# Patient Record
Sex: FEMALE | Race: WHITE | NOT HISPANIC OR LATINO | Employment: PART TIME | URBAN - METROPOLITAN AREA
[De-identification: names, ages, dates, MRNs, and addresses within clinical notes are randomized per-mention and may not be internally consistent; named-entity substitution may affect disease eponyms.]

---

## 2024-01-08 ENCOUNTER — HOSPITAL ENCOUNTER (OUTPATIENT)
Dept: RADIOLOGY | Facility: HOSPITAL | Age: 64
Discharge: HOME | End: 2024-01-08
Payer: COMMERCIAL

## 2024-01-08 ENCOUNTER — EDUCATION (OUTPATIENT)
Dept: ORTHOPEDIC SURGERY | Facility: HOSPITAL | Age: 64
End: 2024-01-08
Payer: COMMERCIAL

## 2024-01-08 DIAGNOSIS — M25.562 PAIN IN LEFT KNEE: ICD-10-CM

## 2024-01-08 PROCEDURE — 73700 CT LOWER EXTREMITY W/O DYE: CPT | Mod: LT

## 2024-01-08 ASSESSMENT — KOOS JR
HOW SEVERE IS YOUR KNEE STIFFNESS AFTER FIRST WAKING IN MORNING: MODERATE
BENDING TO THE FLOOR TO PICK UP OBJECT: SEVERE
GOING UP OR DOWN STAIRS: SEVERE
KOOS JR SCORING: 39.63
TWISING OR PIVOTING ON KNEE: SEVERE
STRAIGHTENING KNEE FULLY: MODERATE
RISING FROM SITTING: SEVERE
STANDING UPRIGHT: SEVERE

## 2024-01-08 NOTE — PROGRESS NOTES
Thank you for attending our Joint Replacement class today in preparation for your upcoming surgery.  Topics discussed include:    MyChart Enrollment  Communication with Care Team  My Chart is the best form of communication to reach all of your caregivers  You can send messages to specific care givers, or a care team  Continued Education  You will be enrolled in a Total Joint Replacement care plan to receive additional education before and after surgery  You can review a short recording of the class content  Access to Medical Records  You can access test results, office notes, appointments, etc.  You can connect to other healthcare systems who use [facility] ([facility], [facility], [facility], [place], etc.)  [email or web address]  Program Information  Consent to Enroll    Background/Understanding of Joint Replacement Surgery  Potential for same day discharge  Any questions or concerns to be directed to the surgeon's office    How to Prepare for Surgery  Use of Nicotine Products/Smoking  Stop several weeks before surgery  Such products slow down the healing process and increase risk of post-op infection and complications  Clearance for Surgery  Medical Clearance by Specialists  Dental Clearance  Cracked/Broken/Loose teeth left untreated may postpone surgery  The importance of post-op antibiotics for dental visits per surgeon protocol  Preadmission Testing  **Potential for postponed surgery if appropriate clearance is not obtained  Medication Instruction  Follow instructions provided by the doctor who prescribes your medication (typically, but not limited to cardiologist)  Preadmission testing will provide additional instructions during your appointment on what to stop and what to take as you get closer to surgery  For clarification of these instructions, please call preadmission testing directly - [phone]  Tips for Preparing the home for discharge from the hospital  Care Partner  Requirement for surgery, the patient must have a plan to have  help at home  Potential for postponed surgery if plan for home support cannot be established  How the care partner can help after surgery  CHG Body Wash/Mouth Wash  Follow the instructions given at preadmission testing  Body wash is to be used on the body and hair for 5 washes  Mouthwash is to be used the night before and morning of surgery  **This is a system-wide protocol developed by infectious disease professionals, we will not alter our recommendations for those with sensitive skin or those who have special hair needs.  Please follow the instructions as they are written as this will provide the best infection prevention measures for surgery.  Should you have an allergy to one of the products, please discuss with your preadmission team**    What to Expect in the Hospital/At Home  Morning of Surgery NPO Guidelines  Nothing to eat after midnight  Water can be consumed up to 2 hours prior to arrival  Surgical and Post-Surgical Care Team  Surgical Team  Anesthesia Team  Nursing  Physical Therapy  Care Coordinating  Pharmacy  Hospital Arrival Instructions  Arrive at the time provided to you  Consider traffic patterns (rush-hour) based on arrival time  Have arrangements made for a ride home  If discharging same day, care partner should remain at the hospital  Recovering after Surgery  Recovery Room - Visitors are not brought back  Transition to hospital room - 2nd Floor, Visitors will be directed to your room  The presence of and strategies for controlling surgical pain and swelling  The importance of early mobility  Side effects after surgery  What to expect if staying overnight    Discharge Planning  The intended plan for discharge will be for patients to discharge home  All patients require a care partner (family, friend, neighbor, etc.) to stay with the patient for the first few nights after surgery  The inability to secure help at home will postpone surgery  Home Care Services set up per surgeon order  Physical  Therapy  Occupational Therapy  **If desired, private duty care can be arranged by the patient ahead of time**  Outpatient Physical Therapy per surgeon order    Recovering at Home  Wound Care  Follow wound care instructions found in your discharge paperwork  Bandage is water-resistant and you may shower with the bandage  Do not scrub directly over the bandage  Do not submerge in water until cleared (bathtub, hot tub, pool, etc.)    Post-Op Risk Prevention  Infection Prevention  Promptly seek treatment for any infections post-operatively  Routine dental visits must be postponed for 3 months after surgery  Your surgeon may require antibiotics prior to future dental visits  Any concerns for infection not related directly to the knee or the hip should be managed by your primary care provider  Blood Clots  Be sure to complete the course of blood thinning medication as prescribed by your surgeon  Movement every 1-2 hours during the day is encouraged to prevent blood clots  Monitor for signs of blood clots  Wear compression stockings as prescribed by your surgeon  Constipation  Constipation is common following surgery  Drink plenty of fluids  Take stool softener/laxative as prescribed by your surgeon  Move around frequently  Eat foods high in fiber  Fall Prevention  Prepare home ahead of time to clear space to move with walker  Remove throw rugs and electrical cords from walkways  Install railings near any stairways with more than 2 steps  Use night lights for increased visibility at night  Continue to use your assistive device until cleared by surgeon or physical therapy  Dislocation Prevention - Not all procedures will have dislocation precautions  Follow dislocation precautions provided by your surgeon  It is OK to resume sexual activity about 6 weeks following surgery  Be sure to follow any dislocation precautions assigned    Durable Medical Equipment  Cold Therapy  Breg Cold Therapy Machines  Ice/Gel Packs  Assistive  Devices  Folding Walker with Wheels (in the front only)  No Rollators  Crutches if approved by Physical Therapy and Surgeon after surgery  Hip Kits  Raised Toilet Seats  Additional Compression Stockings    Joint Preservation  Healthy Activities when Cleared  Walking  Swimming  Bike Riding  Activities to Avoid  Refrain from repetitive motions which have a high impact on the joint  Gradual Progression  Progress activity slowly, listen to your body  Common Findings - NORMAL after surgery  Clicking/Grinding  Numbness near incision    Physical Therapy  Prehabilitation exercises  START TODAY ON BOTH LEGS  Surgery Specific Precautions  Follow surgery specific precautions found in your discharge paperwork    Follow-Up Visit  All patients will see their surgeon for a follow up visit after surgery  The visit may range from 2-6 weeks after surgery and is surgeon specific      Please don't hesitate to reach out if you have any additional questions or concerns.    Usha Soto MBA, BSN, RN-BC  MONSE DealN, RN  Orthopedic Program Navigators  [facility]   [phone]

## 2024-01-18 ENCOUNTER — PRE-ADMISSION TESTING (OUTPATIENT)
Dept: PREADMISSION TESTING | Facility: HOSPITAL | Age: 64
End: 2024-01-18
Payer: COMMERCIAL

## 2024-01-18 VITALS
HEART RATE: 95 BPM | SYSTOLIC BLOOD PRESSURE: 136 MMHG | OXYGEN SATURATION: 96 % | DIASTOLIC BLOOD PRESSURE: 88 MMHG | HEIGHT: 69 IN | TEMPERATURE: 97.4 F | RESPIRATION RATE: 16 BRPM | BODY MASS INDEX: 39.23 KG/M2 | WEIGHT: 264.88 LBS

## 2024-01-18 DIAGNOSIS — Z01.818 PREOPERATIVE TESTING: Primary | ICD-10-CM

## 2024-01-18 LAB
APPEARANCE UR: CLEAR
BILIRUB UR STRIP.AUTO-MCNC: NEGATIVE MG/DL
COLOR UR: YELLOW
EST. AVERAGE GLUCOSE BLD GHB EST-MCNC: 128 MG/DL
GLUCOSE UR STRIP.AUTO-MCNC: NEGATIVE MG/DL
HBA1C MFR BLD: 6.1 %
HOLD SPECIMEN: NORMAL
KETONES UR STRIP.AUTO-MCNC: NEGATIVE MG/DL
LEUKOCYTE ESTERASE UR QL STRIP.AUTO: NEGATIVE
NITRITE UR QL STRIP.AUTO: NEGATIVE
PH UR STRIP.AUTO: 7 [PH]
PROT UR STRIP.AUTO-MCNC: NEGATIVE MG/DL
RBC # UR STRIP.AUTO: NEGATIVE /UL
SP GR UR STRIP.AUTO: 1.01
UROBILINOGEN UR STRIP.AUTO-MCNC: 0.2 MG/DL

## 2024-01-18 PROCEDURE — 83036 HEMOGLOBIN GLYCOSYLATED A1C: CPT

## 2024-01-18 PROCEDURE — 87081 CULTURE SCREEN ONLY: CPT

## 2024-01-18 PROCEDURE — 93010 ELECTROCARDIOGRAM REPORT: CPT | Performed by: INTERNAL MEDICINE

## 2024-01-18 PROCEDURE — 93005 ELECTROCARDIOGRAM TRACING: CPT

## 2024-01-18 PROCEDURE — 36415 COLL VENOUS BLD VENIPUNCTURE: CPT

## 2024-01-18 PROCEDURE — 81003 URINALYSIS AUTO W/O SCOPE: CPT

## 2024-01-18 PROCEDURE — 99204 OFFICE O/P NEW MOD 45 MIN: CPT | Performed by: NURSE PRACTITIONER

## 2024-01-18 RX ORDER — VENLAFAXINE HYDROCHLORIDE 75 MG/1
75 CAPSULE, EXTENDED RELEASE ORAL DAILY
COMMUNITY
Start: 2023-12-12 | End: 2024-12-11

## 2024-01-18 RX ORDER — CHLORHEXIDINE GLUCONATE ORAL RINSE 1.2 MG/ML
15 SOLUTION DENTAL AS NEEDED
Qty: 30 ML | Refills: 0 | Status: SHIPPED | OUTPATIENT
Start: 2024-01-18 | End: 2024-02-01 | Stop reason: HOSPADM

## 2024-01-18 RX ORDER — IBUPROFEN 200 MG
600 TABLET ORAL EVERY 6 HOURS PRN
COMMUNITY
End: 2024-02-01 | Stop reason: HOSPADM

## 2024-01-18 ASSESSMENT — PAIN DESCRIPTION - DESCRIPTORS: DESCRIPTORS: ACHING;SORE

## 2024-01-18 ASSESSMENT — PAIN - FUNCTIONAL ASSESSMENT: PAIN_FUNCTIONAL_ASSESSMENT: 0-10

## 2024-01-18 ASSESSMENT — PAIN SCALES - GENERAL: PAINLEVEL_OUTOF10: 1

## 2024-01-18 NOTE — CPM/PAT H&P
CPM/PAT Evaluation     Phyllis Castro is a 63 y.o. female   Chief Complaint: knee pain having a knee replacement    HPI:  Patient is a 62 y/o alert and oriented female coming in for PAT for a scheduled Arthroplasty Total Knee Left - Robotic Assisted on 24 w/ Dr. Helton.  The patient reports she has intermittent knee pain.  She reports her knee does swell and will give out.  She reports sitting and standing exacerbates the discomfort.  Movement helps.  She had had both cortisone injections and physical therapy.  Patient denies chest pain, SOB, VALLEJO and NVDC.  Patient also denies Hx: DVT/PE.  Current medications were reviewed and a presurgical mediation schedule was provided.  She has no questions at this time.   Past Medical History:   Diagnosis Date    Acute vaginitis 2015    Bacterial vaginosis    Candidiasis, unspecified 2015    Yeast infection    Encounter for screening mammogram for malignant neoplasm of breast 01/15/2016    Visit for screening mammogram    Encounter for screening, unspecified 2015    Screening    Personal history of other diseases of the female genital tract     History of ovarian cyst    Personal history of other diseases of the female genital tract 2015    History of menorrhagia    Personal history of other endocrine, nutritional and metabolic disease     History of dehydration    Plantar fascial fibromatosis 2015    Plantar fasciitis    Unspecified benign mammary dysplasia of unspecified breast     Atypical lobular hyperplasia of breast    Unspecified ovarian cyst, left side 2015    Cyst of left ovary      Past Surgical History:   Procedure Laterality Date     SECTION, CLASSIC  2015     Section    FOOT SURGERY  2015    Foot Repair    OTHER SURGICAL HISTORY  2015    Bx Breast Percutan Needle Core Use Imag Guide (Stereotactic)    OTHER SURGICAL HISTORY  2015    Biopsy Lung Percutaneous    TONSILLECTOMY   01/06/2015    Tonsillectomy    TUBAL LIGATION  01/06/2015    Tubal Ligation    UMBILICAL HERNIA REPAIR  01/06/2015    Umbilical Hernia Repair        No Known Allergies     Current Outpatient Medications on File Prior to Visit   Medication Sig Dispense Refill    venlafaxine XR (Effexor-XR) 75 mg 24 hr capsule Take 1 capsule (75 mg) by mouth once daily.       No current facility-administered medications on file prior to visit.       Review of Systems   Respiratory:          Known sleep apnea compliant with cpap therapy   Musculoskeletal:         See hpi for details   All other systems reviewed and are negative.     Physical Exam  Vitals and nursing note reviewed.   Constitutional:       Appearance: Normal appearance.   HENT:      Head: Normocephalic and atraumatic.      Mouth/Throat:      Mouth: Mucous membranes are moist.      Pharynx: Oropharynx is clear.   Eyes:      Pupils: Pupils are equal, round, and reactive to light.   Cardiovascular:      Rate and Rhythm: Normal rate and regular rhythm.      Heart sounds: Normal heart sounds.      Comments: EKG today NSR rate of 86  Pulmonary:      Effort: Pulmonary effort is normal.      Breath sounds: Normal breath sounds.   Abdominal:      General: Bowel sounds are normal.      Palpations: Abdomen is soft.   Musculoskeletal:         General: Swelling present. Normal range of motion.      Cervical back: Normal range of motion and neck supple.      Comments: Left knee swelling   Skin:     General: Skin is warm and dry.   Neurological:      Mental Status: She is alert and oriented to person, place, and time.   Psychiatric:         Mood and Affect: Mood normal.         Behavior: Behavior normal.         Thought Content: Thought content normal.         Judgment: Judgment normal.        PAT AIRWAY:   Airway:     Mallampati::  IV    TM distance::  >3 FB    Neck ROM::  Full  Had dental crowns  Does not smoke  1 beer a week  No drug use  Has trouble waking up from anesthesia  No  family issues with anesthesia  No nickel, iodine or shellfish allergy    Airway  Vitals:    01/18/24 0934   BP: 136/88   Pulse: 95   Resp: 16   Temp: 36.3 °C (97.4 °F)   SpO2: 96%      Assessment and Plan:   Primary Osteoarthritis of Left Knee  Arthroplasty Total Knee Robotic Assisted  Managed with motrin prn    ASA II  RCRI - 0 points  Class I Risk 3.9%  LIVIER - points Risk for BONITA known BONITA compliant with cpap therapy  NSQIP - Predicted length of stay 0-1 days  ARISCAT - 3 points Low Risk 1.6%  DASI 34.7 Points 7.01 Mets  ABBEY - 0.1%  JHFRAT - 1 points low risk for falls  Clearance - not indicated  PAT Testing - CBC, CMP, UA, MRSA PCR, EKG  CBC, CMP completed on 11/5/23 - stable    CHLORHEXIDINE .12% DENTAL RINSE E PRESCIRBED PER  INFECTION PREVENTION PROTOCOL. PATIENT EDUCATED   Patient advised to call [place] PAT if she does not receive the mouthwash  Face to Face patient contact time 20 minutes    PAULINO Fritz-CNP 1/18/2024 9:38 AM

## 2024-01-18 NOTE — PREPROCEDURE INSTRUCTIONS
Medication List            Accurate as of January 18, 2024  9:46 AM. Always use your most recent med list.                chlorhexidine 0.12 % solution  Commonly known as: Peridex  Use 15 mL in the mouth or throat if needed for wound care for up to 2 doses.     ibuprofen 200 mg tablet  Medication Adjustments for Surgery: Other (Comment)  Notes to patient: Stop 5 days prior to surgery     venlafaxine XR 75 mg 24 hr capsule  Commonly known as: Effexor-XR  Medication Adjustments for Surgery: Take morning of surgery with sip of water, no other fluids                              NPO Instructions:    Do not eat any food after midnight the night before your surgery/procedure.    Additional Instructions:     Seven/Six Days before Surgery:  Review your medication instructions, stop indicated medications  Five Days before Surgery:  Review your medication instructions, stop indicated medications  Three Days before Surgery:  Review your medication instructions, stop indicated medications  The Day before Surgery:  Review your medication instructions, stop indicated medications  You will be contacted regarding the time of your arrival to facility and surgery time  Do not eat any food after Midnight  Day of Surgery:  Review your medication instructions, take indicated medications  Wear  comfortable loose fitting clothing  Do not use moisturizers, creams, lotions or perfume  All jewelry and valuables should be left at home      Follow written instructions for CHG wash and mouth rinse given and explained in preadmission testing.                                          
14-Apr-2018 14:20

## 2024-01-18 NOTE — H&P (VIEW-ONLY)
CPM/PAT Evaluation     Phyllis Castro is a 63 y.o. female   Chief Complaint: knee pain having a knee replacement    HPI:  Patient is a 64 y/o alert and oriented female coming in for PAT for a scheduled Arthroplasty Total Knee Left - Robotic Assisted on 24 w/ Dr. Helton.  The patient reports she has intermittent knee pain.  She reports her knee does swell and will give out.  She reports sitting and standing exacerbates the discomfort.  Movement helps.  She had had both cortisone injections and physical therapy.  Patient denies chest pain, SOB, VALLEJO and NVDC.  Patient also denies Hx: DVT/PE.  Current medications were reviewed and a presurgical mediation schedule was provided.  She has no questions at this time.   Past Medical History:   Diagnosis Date    Acute vaginitis 2015    Bacterial vaginosis    Candidiasis, unspecified 2015    Yeast infection    Encounter for screening mammogram for malignant neoplasm of breast 01/15/2016    Visit for screening mammogram    Encounter for screening, unspecified 2015    Screening    Personal history of other diseases of the female genital tract     History of ovarian cyst    Personal history of other diseases of the female genital tract 2015    History of menorrhagia    Personal history of other endocrine, nutritional and metabolic disease     History of dehydration    Plantar fascial fibromatosis 2015    Plantar fasciitis    Unspecified benign mammary dysplasia of unspecified breast     Atypical lobular hyperplasia of breast    Unspecified ovarian cyst, left side 2015    Cyst of left ovary      Past Surgical History:   Procedure Laterality Date     SECTION, CLASSIC  2015     Section    FOOT SURGERY  2015    Foot Repair    OTHER SURGICAL HISTORY  2015    Bx Breast Percutan Needle Core Use Imag Guide (Stereotactic)    OTHER SURGICAL HISTORY  2015    Biopsy Lung Percutaneous    TONSILLECTOMY   01/06/2015    Tonsillectomy    TUBAL LIGATION  01/06/2015    Tubal Ligation    UMBILICAL HERNIA REPAIR  01/06/2015    Umbilical Hernia Repair        No Known Allergies     Current Outpatient Medications on File Prior to Visit   Medication Sig Dispense Refill    venlafaxine XR (Effexor-XR) 75 mg 24 hr capsule Take 1 capsule (75 mg) by mouth once daily.       No current facility-administered medications on file prior to visit.       Review of Systems   Respiratory:          Known sleep apnea compliant with cpap therapy   Musculoskeletal:         See hpi for details   All other systems reviewed and are negative.     Physical Exam  Vitals and nursing note reviewed.   Constitutional:       Appearance: Normal appearance.   HENT:      Head: Normocephalic and atraumatic.      Mouth/Throat:      Mouth: Mucous membranes are moist.      Pharynx: Oropharynx is clear.   Eyes:      Pupils: Pupils are equal, round, and reactive to light.   Cardiovascular:      Rate and Rhythm: Normal rate and regular rhythm.      Heart sounds: Normal heart sounds.      Comments: EKG today NSR rate of 86  Pulmonary:      Effort: Pulmonary effort is normal.      Breath sounds: Normal breath sounds.   Abdominal:      General: Bowel sounds are normal.      Palpations: Abdomen is soft.   Musculoskeletal:         General: Swelling present. Normal range of motion.      Cervical back: Normal range of motion and neck supple.      Comments: Left knee swelling   Skin:     General: Skin is warm and dry.   Neurological:      Mental Status: She is alert and oriented to person, place, and time.   Psychiatric:         Mood and Affect: Mood normal.         Behavior: Behavior normal.         Thought Content: Thought content normal.         Judgment: Judgment normal.        PAT AIRWAY:   Airway:     Mallampati::  IV    TM distance::  >3 FB    Neck ROM::  Full  Had dental crowns  Does not smoke  1 beer a week  No drug use  Has trouble waking up from anesthesia  No  family issues with anesthesia  No nickel, iodine or shellfish allergy    Airway  Vitals:    01/18/24 0934   BP: 136/88   Pulse: 95   Resp: 16   Temp: 36.3 °C (97.4 °F)   SpO2: 96%      Assessment and Plan:   Primary Osteoarthritis of Left Knee  Arthroplasty Total Knee Robotic Assisted  Managed with motrin prn    ASA II  RCRI - 0 points  Class I Risk 3.9%  LIVIER - points Risk for BONITA known BONITA compliant with cpap therapy  NSQIP - Predicted length of stay 0-1 days  ARISCAT - 3 points Low Risk 1.6%  DASI 34.7 Points 7.01 Mets  ABBEY - 0.1%  JHFRAT - 1 points low risk for falls  Clearance - not indicated  PAT Testing - CBC, CMP, UA, MRSA PCR, EKG  CBC, CMP completed on 11/5/23 - stable    CHLORHEXIDINE .12% DENTAL RINSE E PRESCIRBED PER  INFECTION PREVENTION PROTOCOL. PATIENT EDUCATED   Patient advised to call [place] PAT if she does not receive the mouthwash  Face to Face patient contact time 20 minutes    PAULINO Fritz-CNP 1/18/2024 9:38 AM

## 2024-01-20 LAB — STAPHYLOCOCCUS SPEC CULT: NORMAL

## 2024-01-21 LAB
ATRIAL RATE: 86 BPM
P AXIS: 28 DEGREES
P OFFSET: 210 MS
P ONSET: 154 MS
PR INTERVAL: 142 MS
Q ONSET: 225 MS
QRS COUNT: 14 BEATS
QRS DURATION: 80 MS
QT INTERVAL: 346 MS
QTC CALCULATION(BAZETT): 414 MS
QTC FREDERICIA: 390 MS
R AXIS: -8 DEGREES
T AXIS: 37 DEGREES
T OFFSET: 398 MS
VENTRICULAR RATE: 86 BPM

## 2024-01-23 ENCOUNTER — TELEPHONE (OUTPATIENT)
Dept: ORTHOPEDIC SURGERY | Facility: HOSPITAL | Age: 64
End: 2024-01-23
Payer: COMMERCIAL

## 2024-01-23 NOTE — TELEPHONE ENCOUNTER
Please call [phone] at your earliest convenience to discuss details for your upcoming surgery with Dr. Alcides Helton.  I can be reached during normal business hours, Monday through Friday.    Thanks,  Usha Soto MBA, BSN, RN-BC  MONSE DealN-RN  Orthopedic Program Navigators  [facility]  [phone]

## 2024-01-29 ENCOUNTER — ANESTHESIA EVENT (OUTPATIENT)
Dept: OPERATING ROOM | Facility: HOSPITAL | Age: 64
End: 2024-01-29
Payer: COMMERCIAL

## 2024-01-30 PROBLEM — M17.12 PRIMARY OSTEOARTHRITIS OF LEFT KNEE: Status: ACTIVE | Noted: 2024-01-30

## 2024-01-30 NOTE — DISCHARGE INSTRUCTIONS
[facility]                            Usha Soto MBA, BSN, RN-BC Orthopedic  Phone: [phone]    Alcides Helton D.O.  Phone: [phone]    POSTOPERATIVE INSTRUCTIONS: TOTAL HIP & TOTAL KNEE ARTHROPLASTY    General/highlights: Ice and elevate as often possible, knee straight ankle higher than hip. Work hard on maintaining/gaining range of motion.  Wear the GONZALO hose during the day, remove at night for the next 2 weeks.  Flex/tighten the muscles in the buttocks, thighs and calves often when in chair or bed.  Utilize the incentive spirometer often especially the next 3 days.  Walk at least 10 steps every hour that you are awake.  Take stairs 1 at a time, good leg leads up, bed leg legs down, use the handrails.  You may be weightbearing as tolerated, in general the walker is used for 2 weeks.  New home medications at discharge: Percocet to be taken as needed for pain, baclofen to be taken as needed for muscle spasm, MiraLAX powder once or twice a day when taking the Percocet to avoid constipation, enteric-coated aspirin 81 mg by mouth twice a day for the next 2 weeks, this is your blood thinner  PAIN, SWELLING & BRUISING  Some pain, stiffness and swelling is normal for up to 1 year after surgery.  Pain will start to let up over time depending on your activity level.  It is preferable to rest for 24 hours following your surgery  Pain is often delayed for 24-48 hours after surgery.  Pain may be dull/achy, throbbing, or even sharp/nerve sensations  It is normal for swelling and bruising to worsen before it gets better.  These symptoms usually peak 1 week after surgery  Swelling and bruising may appear throughout the leg, all the way down to your toes  Wear your compression stockings every day during the day for 14 days and remove them at night.  This will help control swelling    WOUND CARE INSTRUCTIONS  Your surgical bandage will be removed 2 weeks after  surgery at your post-operative visit.  If your bandage becomes compromised, begins to come off before then, or soaks through with drainage call the office immediately.  You may have sutures under the skin that dissolve on their own over time.    As the sutures absorb occasionally a small suture abscess can develop, this is not uncommon for up to 6 weeks, if this occurs please notify your surgeon immediately.    HYGIENE  You may shower 24 hours after your surgery, provided the Mepilex silver dressing is in place.  No tub bathing or submerging underwater.  Do not scrub directly over the surgical bandage  Do not use any creams, lotions or ointments on the surgical leg for 4 weeks after surgery, or until you have been cleared to do so by your surgeon    GENERAL INSTRUCTIONS  Do not drink alcoholic beverages (beer and wine included) for 24 hours following your surgery, or while you are taking narcotic pain medications  Delay making important decisions until you are fully recovered  You cannot swim or submerge in water for at least 6 weeks after surgery, or until you are cleared by your surgeon.  You may start kneeling 3 months after knee replacement surgery, once you have been cleared by your surgeon.  This may not ever feel “normal” or comfortable    HOME DIET  Resume your normal diet after surgery. If you are on a specific type of diet for your condition, resume that instead.    Choose foods that help promote good bowel habits and prevent constipation, such as foods high in fiber.          POSTOPERATIVE MEDICATIONS    Pain medications have been ordered to help manage pain throughout recovery.  While you are using narcotic pain medication, you should be using a stool softener or laxative to prevent constipation.  My preference is parallax powder once or twice a day when taking the narcotic pain medicine  It is important to eat a small meal or snack before taking pain medications to avoid nausea or stomach  "upset.    MEDICATION REFILLS - [phone]    If you need to request a medication refill, please call the office between 8:30am-4:30pm, Monday through Friday.    Any calls received outside of this timeframe will be handled on the next business day.    Medication requests received on Saturday or Sunday will be handled on Monday.    Please allow 3-5 business days for all medication requests to be processed.    RESTARTING HOME MEDICATIONS  You may restart your home medications the following day after your surgery UNLESS you have been given alternate instructions.    Follow the instructions given to you on your hospital discharge instructions for more information regarding your home medications.    ASSISTIVE DEVICE & MOVEMENT  Initially, you will use a walker or crutches to walk for the first 2 weeks.  Once your therapist feels you are ready, you will wean to one crutch or cane followed by no assistive device.  It is important to keep moving throughout recovery.  Walk at least 10 steps every hour that you are awake.  Stairs are part of your recovery, the \"good \"leg leads on the way up, the \"bad \"leg leads on the way down to, use the handrails and not the walker or crutches.  You should be up and walking around several times per day as well as bending your knee and making sure your knee is going completely straight (for knee replacements).  For anterior hip replacements avoid straight leg raise.    NUMBNESS/CLICKING    Decreased sensation or numbness is common on or around the area of the incision due to sensory nerves that are affected at the time of surgery.  The area of numbness will be lateral to the incision  You might always have numbness but the size of the area of numbness should decrease with time.  It is common for patients to have a “click” in the knee with movement.  This is usually nothing to be concerned about.  There are several reasons why your knee can be making these noises, including the implant " components rubbing against each other, or a tendons going over a bony prominence.  Grinding can also occur and is not out of the ordinary.  Grinding can be caused by scar tissue formation  Noises will often settle over time once muscle strength improves.    DIFFICULTY SLEEPING    It is very common for patients to have difficulty sleeping at night which can be caused pain, medication, or feeling of anxiety.  Sleep disturbance typically worsens 4-6 weeks after surgery.  You are permitted to sleep on your back or side with a  pillow between your legs for comfort            DRIVING & TRAVEL  Your surgeon will address this at your post-op appointment.  You must not be taking narcotic pain medication to be cleared to drive.  LEFT LEG JOINT REPLACMENT:  You may drive once you have regained full control of your leg, typically around 2 week after surgery  RIGHT LEG JOINT REPLACEMENT:  You must speak with your surgeon before you resume driving.  Driving can typically be resumed by 4-6 weeks after surgery.  During long distance travel, you should attempt to change position or stand every hour.  You should complete ankle pumps throughout your travel if you are sitting for long periods of time.  If traveling within the first 2 weeks after surgery, you should wear your compression stockings.    DENTAL & OTHER PROCEDURES    All patients must wait a minimum of 3 months for elective procedures, including routine dental cleanings.  For any dental appointment - cleaning or dental procedures - patients must take a prophylactic antibiotic 1 hour before the appointment.  You will also need to call for an antibiotic prior to any other invasive test, procedure, or surgery.  These prophylactic antibiotics will be needed for the rest of your life, in order to prevent infections.  Please call your surgeons office at [phone] to request the antibiotic.      PHYSICAL THERAPY    Following surgery it is important to progress through recovery  with in-home or outpatient physical therapy.  You should continue to complete home exercises provided from the hospital on days that you are not working with a physical therapist.    It is common to have a temporary increase in pain and swelling upon starting outpatient physical therapy and/or changing your exercise routine.  Continue to use ice to help with symptoms.     FOLLOW-UP APPOINTMENT - [phone]    Your post-surgical appointments will take place approximately 2 weeks, 6 weeks, 3 months and 1 year following surgery.  Joint replacements are monitored thereafter every 2-5 years for life.  If you have any questions or need to make changes to this appointment, please contact the office:    EMERGENCIES & WHEN TO CALL YOUR SURGEON  When to contact our office immediately:  Any falls or injury to the joint replacement  Fever >101.5 for at least 48 hours after surgery or chills.  Excessive bleeding from incision(s). A small amount of drainage is normal and expected.  Signs of infection of incision(s)-excessive drainage that is soaking through your dressing (especially if it is pus-like), redness that is spreading out from the edges of your incision, or increased warmth around the area.  Excruciating pain for which the pain medication, taken as instructed, is not helping.  Severe calf pain.  Go directly to the emergency room or call 911, if you are experiencing chest pain or difficulty breathing.              ICE/COLD THERAPY  Ice is most important during the first 2 weeks after surgery, but should be used for several weeks as needed.  Never place ice, or cold therapy devices directly on the skin.  You should always have a protective layer between your skin and the cold.  You have been prescribed to ice your total joint at a minimum of twice per hour for 20 minutes while awake during the first 6 weeks after surgery if you are using ice packs. This will help with pain control.  If you are using an ice machine,  please follow ice machine instructions.  After knee replacement is extremely important to elevate the leg straight on an incline, not flat.    COLD THERAPY MACHINE RECOMMENDATIONS      Cold therapy devices can be used before and after surgery to assist in comfort and help to reduce pain and swelling.  These devices differ from ice or ice packs whereas the mechanism circulates water through tubing and a pad to provide longer periods of cold therapy to the desired site.  While in the hospital, you can use your cold devices around the clock for optimal comfort.  We recommend using cold therapy after working with therapy or completing exercises on your own.  Once you are discharged home, there is no set schedule in which you must follow while using cold therapy.  Below are a few points to remember when using a cold therapy device:    Read the 's instructions prior to first the use.  Follow instructions for filling the cooler (water first, then ice).  Always make sure there is a layer of protection between the cold pad and your skin (Clothing, Towel, Ace Bandage, etc.)  Allow the device to circulate cold water throughout the pad prior wrapping the pad around your leg (approximately 10 minutes).  Place the pad on your leg in the desired position to meet your pain management needs and use the wraps provided to secure the pad to your body.  The purpose of this device is to use consistently throughout the day.  You do not need to need to use the 20 on, 20 off method when using an ice machine.  During waking hours, remove the cold pad every 1-2 hours to perform a skin check  Detach the pad from the cooler and ambulate at least once every hour  After removing the pad, allow at least 30 minutes before resuming cold therapy  You may wear the cold therapy device during periods of sleep including overnight    If you wake up during the night, you can check the skin at this time.  You do not need to wake up specifically  to perform skin checks.  Empty the cooler and pad when device is not in use.  Follow 's instructions for cleaning your cold therapy device.    [facility] can assist with problems related to products purchased through the [facility]  [phone] - Ayah   or   [phone] - Andreina    Sample Medication Schedule  [facility]    Medication Refills - [phone] - Monday through Friday 8:30am-4:30pm  Please allow 3-5 days for medication refill requests to be processed.

## 2024-01-31 ENCOUNTER — HOSPITAL ENCOUNTER (OUTPATIENT)
Facility: HOSPITAL | Age: 64
Setting detail: OBSERVATION
Discharge: HOME | End: 2024-02-01
Attending: ORTHOPAEDIC SURGERY | Admitting: ORTHOPAEDIC SURGERY
Payer: COMMERCIAL

## 2024-01-31 ENCOUNTER — ANESTHESIA (OUTPATIENT)
Dept: OPERATING ROOM | Facility: HOSPITAL | Age: 64
End: 2024-01-31
Payer: COMMERCIAL

## 2024-01-31 ENCOUNTER — PHARMACY VISIT (OUTPATIENT)
Dept: PHARMACY | Facility: CLINIC | Age: 64
End: 2024-01-31
Payer: MEDICARE

## 2024-01-31 ENCOUNTER — APPOINTMENT (OUTPATIENT)
Dept: RADIOLOGY | Facility: HOSPITAL | Age: 64
End: 2024-01-31
Payer: COMMERCIAL

## 2024-01-31 DIAGNOSIS — M17.12 PRIMARY OSTEOARTHRITIS OF LEFT KNEE: Primary | ICD-10-CM

## 2024-01-31 PROBLEM — T88.59XA DELAYED EMERGENCE FROM ANESTHESIA: Status: ACTIVE | Noted: 2024-01-31

## 2024-01-31 PROBLEM — M19.90 ARTHRITIS: Status: ACTIVE | Noted: 2024-01-31

## 2024-01-31 PROCEDURE — 76942 ECHO GUIDE FOR BIOPSY: CPT | Performed by: NURSE ANESTHETIST, CERTIFIED REGISTERED

## 2024-01-31 PROCEDURE — 3600000017 HC OR TIME - EACH INCREMENTAL 1 MINUTE - PROCEDURE LEVEL SIX: Performed by: ORTHOPAEDIC SURGERY

## 2024-01-31 PROCEDURE — G0378 HOSPITAL OBSERVATION PER HR: HCPCS

## 2024-01-31 PROCEDURE — 2500000004 HC RX 250 GENERAL PHARMACY W/ HCPCS (ALT 636 FOR OP/ED): Performed by: ORTHOPAEDIC SURGERY

## 2024-01-31 PROCEDURE — 2500000005 HC RX 250 GENERAL PHARMACY W/O HCPCS: Performed by: ORTHOPAEDIC SURGERY

## 2024-01-31 PROCEDURE — 96365 THER/PROPH/DIAG IV INF INIT: CPT | Mod: 59

## 2024-01-31 PROCEDURE — 3700000002 HC GENERAL ANESTHESIA TIME - EACH INCREMENTAL 1 MINUTE: Performed by: ORTHOPAEDIC SURGERY

## 2024-01-31 PROCEDURE — RXMED WILLOW AMBULATORY MEDICATION CHARGE

## 2024-01-31 PROCEDURE — 2500000004 HC RX 250 GENERAL PHARMACY W/ HCPCS (ALT 636 FOR OP/ED): Performed by: PHARMACIST

## 2024-01-31 PROCEDURE — C1776 JOINT DEVICE (IMPLANTABLE): HCPCS | Performed by: ORTHOPAEDIC SURGERY

## 2024-01-31 PROCEDURE — 7100000002 HC RECOVERY ROOM TIME - EACH INCREMENTAL 1 MINUTE: Performed by: ORTHOPAEDIC SURGERY

## 2024-01-31 PROCEDURE — 3600000018 HC OR TIME - INITIAL BASE CHARGE - PROCEDURE LEVEL SIX: Performed by: ORTHOPAEDIC SURGERY

## 2024-01-31 PROCEDURE — 73560 X-RAY EXAM OF KNEE 1 OR 2: CPT | Mod: LT

## 2024-01-31 PROCEDURE — 7100000001 HC RECOVERY ROOM TIME - INITIAL BASE CHARGE: Performed by: ORTHOPAEDIC SURGERY

## 2024-01-31 PROCEDURE — 96375 TX/PRO/DX INJ NEW DRUG ADDON: CPT | Mod: 59

## 2024-01-31 PROCEDURE — 2720000007 HC OR 272 NO HCPCS: Performed by: ORTHOPAEDIC SURGERY

## 2024-01-31 PROCEDURE — A27447 PR TOTAL KNEE ARTHROPLASTY: Performed by: NURSE ANESTHETIST, CERTIFIED REGISTERED

## 2024-01-31 PROCEDURE — A27447 PR TOTAL KNEE ARTHROPLASTY: Performed by: STUDENT IN AN ORGANIZED HEALTH CARE EDUCATION/TRAINING PROGRAM

## 2024-01-31 PROCEDURE — 2500000004 HC RX 250 GENERAL PHARMACY W/ HCPCS (ALT 636 FOR OP/ED): Performed by: STUDENT IN AN ORGANIZED HEALTH CARE EDUCATION/TRAINING PROGRAM

## 2024-01-31 PROCEDURE — A4649 SURGICAL SUPPLIES: HCPCS | Performed by: ORTHOPAEDIC SURGERY

## 2024-01-31 PROCEDURE — 2500000005 HC RX 250 GENERAL PHARMACY W/O HCPCS: Performed by: NURSE ANESTHETIST, CERTIFIED REGISTERED

## 2024-01-31 PROCEDURE — 2780000003 HC OR 278 NO HCPCS: Performed by: ORTHOPAEDIC SURGERY

## 2024-01-31 PROCEDURE — 2500000001 HC RX 250 WO HCPCS SELF ADMINISTERED DRUGS (ALT 637 FOR MEDICARE OP): Performed by: ORTHOPAEDIC SURGERY

## 2024-01-31 PROCEDURE — 3700000001 HC GENERAL ANESTHESIA TIME - INITIAL BASE CHARGE: Performed by: ORTHOPAEDIC SURGERY

## 2024-01-31 PROCEDURE — 2500000004 HC RX 250 GENERAL PHARMACY W/ HCPCS (ALT 636 FOR OP/ED): Performed by: NURSE ANESTHETIST, CERTIFIED REGISTERED

## 2024-01-31 PROCEDURE — 96376 TX/PRO/DX INJ SAME DRUG ADON: CPT | Mod: 59

## 2024-01-31 DEVICE — BONE PINS (3.2MM X 110MM): Type: IMPLANTABLE DEVICE | Site: KNEE | Status: NON-FUNCTIONAL

## 2024-01-31 DEVICE — TIBIAL BEARING INSERT - CS
Type: IMPLANTABLE DEVICE | Site: KNEE | Status: FUNCTIONAL
Brand: TRIATHLON

## 2024-01-31 DEVICE — TIBIAL COMPONENT
Type: IMPLANTABLE DEVICE | Site: KNEE | Status: FUNCTIONAL
Brand: TRIATHLON

## 2024-01-31 DEVICE — PATELLA
Type: IMPLANTABLE DEVICE | Site: KNEE | Status: FUNCTIONAL
Brand: TRIATHLON

## 2024-01-31 DEVICE — CRUCIATE RETAINING FEMORAL
Type: IMPLANTABLE DEVICE | Site: KNEE | Status: FUNCTIONAL
Brand: TRIATHLON

## 2024-01-31 RX ORDER — VENLAFAXINE HYDROCHLORIDE 75 MG/1
75 CAPSULE, EXTENDED RELEASE ORAL DAILY
Status: DISCONTINUED | OUTPATIENT
Start: 2024-02-01 | End: 2024-02-01 | Stop reason: HOSPADM

## 2024-01-31 RX ORDER — POLYETHYLENE GLYCOL 3350 17 G/17G
17 POWDER, FOR SOLUTION ORAL DAILY
Status: DISCONTINUED | OUTPATIENT
Start: 2024-02-01 | End: 2024-02-01 | Stop reason: HOSPADM

## 2024-01-31 RX ORDER — CEFAZOLIN 1 G/1
INJECTION, POWDER, FOR SOLUTION INTRAVENOUS AS NEEDED
Status: DISCONTINUED | OUTPATIENT
Start: 2024-01-31 | End: 2024-01-31

## 2024-01-31 RX ORDER — ACETAMINOPHEN 325 MG/1
650 TABLET ORAL EVERY 6 HOURS PRN
Status: DISCONTINUED | OUTPATIENT
Start: 2024-01-31 | End: 2024-02-01 | Stop reason: HOSPADM

## 2024-01-31 RX ORDER — TALC
6 POWDER (GRAM) TOPICAL NIGHTLY PRN
Status: DISCONTINUED | OUTPATIENT
Start: 2024-01-31 | End: 2024-02-01 | Stop reason: HOSPADM

## 2024-01-31 RX ORDER — CEFAZOLIN SODIUM 2 G/100ML
2 INJECTION, SOLUTION INTRAVENOUS
Status: COMPLETED | OUTPATIENT
Start: 2024-01-31 | End: 2024-01-31

## 2024-01-31 RX ORDER — ONDANSETRON HYDROCHLORIDE 2 MG/ML
4 INJECTION, SOLUTION INTRAVENOUS ONCE AS NEEDED
Status: DISCONTINUED | OUTPATIENT
Start: 2024-01-31 | End: 2024-01-31 | Stop reason: HOSPADM

## 2024-01-31 RX ORDER — SODIUM CHLORIDE, SODIUM LACTATE, POTASSIUM CHLORIDE, CALCIUM CHLORIDE 600; 310; 30; 20 MG/100ML; MG/100ML; MG/100ML; MG/100ML
100 INJECTION, SOLUTION INTRAVENOUS CONTINUOUS
Status: DISCONTINUED | OUTPATIENT
Start: 2024-01-31 | End: 2024-01-31 | Stop reason: HOSPADM

## 2024-01-31 RX ORDER — ONDANSETRON HYDROCHLORIDE 2 MG/ML
INJECTION, SOLUTION INTRAVENOUS AS NEEDED
Status: DISCONTINUED | OUTPATIENT
Start: 2024-01-31 | End: 2024-01-31

## 2024-01-31 RX ORDER — ASPIRIN 81 MG/1
81 TABLET ORAL 2 TIMES DAILY
Status: DISCONTINUED | OUTPATIENT
Start: 2024-01-31 | End: 2024-02-01 | Stop reason: HOSPADM

## 2024-01-31 RX ORDER — POLYETHYLENE GLYCOL 3350 17 G/17G
17 POWDER, FOR SOLUTION ORAL DAILY
COMMUNITY
Start: 2024-01-31

## 2024-01-31 RX ORDER — SODIUM CHLORIDE, SODIUM LACTATE, POTASSIUM CHLORIDE, CALCIUM CHLORIDE 600; 310; 30; 20 MG/100ML; MG/100ML; MG/100ML; MG/100ML
100 INJECTION, SOLUTION INTRAVENOUS CONTINUOUS
Status: DISCONTINUED | OUTPATIENT
Start: 2024-01-31 | End: 2024-02-01 | Stop reason: HOSPADM

## 2024-01-31 RX ORDER — PROPOFOL 10 MG/ML
INJECTION, EMULSION INTRAVENOUS CONTINUOUS PRN
Status: DISCONTINUED | OUTPATIENT
Start: 2024-01-31 | End: 2024-01-31

## 2024-01-31 RX ORDER — NALOXONE HYDROCHLORIDE 0.4 MG/ML
0.2 INJECTION, SOLUTION INTRAMUSCULAR; INTRAVENOUS; SUBCUTANEOUS EVERY 5 MIN PRN
Status: DISCONTINUED | OUTPATIENT
Start: 2024-01-31 | End: 2024-02-01 | Stop reason: HOSPADM

## 2024-01-31 RX ORDER — ALBUTEROL SULFATE 0.83 MG/ML
2.5 SOLUTION RESPIRATORY (INHALATION) ONCE AS NEEDED
Status: DISCONTINUED | OUTPATIENT
Start: 2024-01-31 | End: 2024-01-31 | Stop reason: HOSPADM

## 2024-01-31 RX ORDER — FENTANYL CITRATE 50 UG/ML
INJECTION, SOLUTION INTRAMUSCULAR; INTRAVENOUS AS NEEDED
Status: DISCONTINUED | OUTPATIENT
Start: 2024-01-31 | End: 2024-01-31

## 2024-01-31 RX ORDER — FENTANYL CITRATE 50 UG/ML
50 INJECTION, SOLUTION INTRAMUSCULAR; INTRAVENOUS ONCE
Status: COMPLETED | OUTPATIENT
Start: 2024-01-31 | End: 2024-01-31

## 2024-01-31 RX ORDER — MIDAZOLAM HYDROCHLORIDE 1 MG/ML
2 INJECTION, SOLUTION INTRAMUSCULAR; INTRAVENOUS ONCE
Status: COMPLETED | OUTPATIENT
Start: 2024-01-31 | End: 2024-01-31

## 2024-01-31 RX ORDER — ONDANSETRON 4 MG/1
4 TABLET, ORALLY DISINTEGRATING ORAL EVERY 8 HOURS PRN
Status: DISCONTINUED | OUTPATIENT
Start: 2024-01-31 | End: 2024-02-01 | Stop reason: HOSPADM

## 2024-01-31 RX ORDER — KETOROLAC TROMETHAMINE 30 MG/ML
30 INJECTION, SOLUTION INTRAMUSCULAR; INTRAVENOUS EVERY 6 HOURS
Status: DISCONTINUED | OUTPATIENT
Start: 2024-01-31 | End: 2024-02-01 | Stop reason: HOSPADM

## 2024-01-31 RX ORDER — OXYCODONE AND ACETAMINOPHEN 5; 325 MG/1; MG/1
1 TABLET ORAL EVERY 4 HOURS PRN
Status: DISCONTINUED | OUTPATIENT
Start: 2024-01-31 | End: 2024-02-01 | Stop reason: HOSPADM

## 2024-01-31 RX ORDER — CEFAZOLIN SODIUM IN 0.9 % NACL 3 G/100 ML
3 INTRAVENOUS SOLUTION, PIGGYBACK (ML) INTRAVENOUS EVERY 8 HOURS
Status: DISCONTINUED | OUTPATIENT
Start: 2024-01-31 | End: 2024-01-31

## 2024-01-31 RX ORDER — CEFAZOLIN SODIUM IN 0.9 % NACL 3 G/100 ML
3 INTRAVENOUS SOLUTION, PIGGYBACK (ML) INTRAVENOUS ONCE
Status: DISCONTINUED | OUTPATIENT
Start: 2024-01-31 | End: 2024-01-31

## 2024-01-31 RX ORDER — OXYCODONE HYDROCHLORIDE 5 MG/1
10 TABLET ORAL EVERY 4 HOURS PRN
Status: DISCONTINUED | OUTPATIENT
Start: 2024-01-31 | End: 2024-01-31 | Stop reason: HOSPADM

## 2024-01-31 RX ORDER — DEXAMETHASONE SODIUM PHOSPHATE 4 MG/ML
INJECTION, SOLUTION INTRA-ARTICULAR; INTRALESIONAL; INTRAMUSCULAR; INTRAVENOUS; SOFT TISSUE AS NEEDED
Status: DISCONTINUED | OUTPATIENT
Start: 2024-01-31 | End: 2024-01-31

## 2024-01-31 RX ORDER — OXYCODONE AND ACETAMINOPHEN 10; 325 MG/1; MG/1
1 TABLET ORAL EVERY 4 HOURS PRN
Status: DISCONTINUED | OUTPATIENT
Start: 2024-01-31 | End: 2024-02-01 | Stop reason: HOSPADM

## 2024-01-31 RX ORDER — OXYCODONE HYDROCHLORIDE 5 MG/1
5 TABLET ORAL EVERY 4 HOURS PRN
Status: DISCONTINUED | OUTPATIENT
Start: 2024-01-31 | End: 2024-01-31 | Stop reason: HOSPADM

## 2024-01-31 RX ORDER — ONDANSETRON HYDROCHLORIDE 2 MG/ML
4 INJECTION, SOLUTION INTRAVENOUS EVERY 8 HOURS PRN
Status: DISCONTINUED | OUTPATIENT
Start: 2024-01-31 | End: 2024-02-01 | Stop reason: HOSPADM

## 2024-01-31 RX ORDER — ROPIVACAINE/EPI/CLONIDINE/KET 2.46-0.005
SYRINGE (ML) INJECTION AS NEEDED
Status: DISCONTINUED | OUTPATIENT
Start: 2024-01-31 | End: 2024-01-31 | Stop reason: HOSPADM

## 2024-01-31 RX ORDER — CEFAZOLIN SODIUM 2 G/100ML
2 INJECTION, SOLUTION INTRAVENOUS EVERY 8 HOURS
Status: COMPLETED | OUTPATIENT
Start: 2024-01-31 | End: 2024-02-01

## 2024-01-31 RX ORDER — ROPIVACAINE HYDROCHLORIDE 5 MG/ML
INJECTION, SOLUTION EPIDURAL; INFILTRATION; PERINEURAL AS NEEDED
Status: DISCONTINUED | OUTPATIENT
Start: 2024-01-31 | End: 2024-01-31

## 2024-01-31 RX ORDER — DIPHENHYDRAMINE HYDROCHLORIDE 50 MG/ML
12.5 INJECTION INTRAMUSCULAR; INTRAVENOUS EVERY 6 HOURS PRN
Status: DISCONTINUED | OUTPATIENT
Start: 2024-01-31 | End: 2024-02-01 | Stop reason: HOSPADM

## 2024-01-31 RX ORDER — OXYCODONE AND ACETAMINOPHEN 5; 325 MG/1; MG/1
0.5 TABLET ORAL EVERY 4 HOURS PRN
Status: DISCONTINUED | OUTPATIENT
Start: 2024-01-31 | End: 2024-02-01 | Stop reason: HOSPADM

## 2024-01-31 RX ORDER — DEXAMETHASONE SODIUM PHOSPHATE 100 MG/10ML
INJECTION INTRAMUSCULAR; INTRAVENOUS AS NEEDED
Status: DISCONTINUED | OUTPATIENT
Start: 2024-01-31 | End: 2024-01-31

## 2024-01-31 RX ORDER — ASPIRIN 81 MG/1
81 TABLET ORAL 2 TIMES DAILY
Qty: 28 TABLET | Refills: 0 | COMMUNITY
Start: 2024-01-31 | End: 2024-02-14

## 2024-01-31 RX ORDER — TRANEXAMIC ACID 100 MG/ML
INJECTION, SOLUTION INTRAVENOUS AS NEEDED
Status: DISCONTINUED | OUTPATIENT
Start: 2024-01-31 | End: 2024-01-31

## 2024-01-31 RX ORDER — ACETAMINOPHEN 325 MG/1
650 TABLET ORAL EVERY 4 HOURS PRN
Status: DISCONTINUED | OUTPATIENT
Start: 2024-01-31 | End: 2024-01-31 | Stop reason: HOSPADM

## 2024-01-31 RX ADMIN — FENTANYL CITRATE 50 MCG: 0.05 INJECTION, SOLUTION INTRAMUSCULAR; INTRAVENOUS at 14:45

## 2024-01-31 RX ADMIN — FENTANYL CITRATE 50 MCG: 50 INJECTION INTRAMUSCULAR; INTRAVENOUS at 15:05

## 2024-01-31 RX ADMIN — KETOROLAC TROMETHAMINE 30 MG: 30 INJECTION INTRAMUSCULAR; INTRAVENOUS at 18:28

## 2024-01-31 RX ADMIN — DEXAMETHASONE SODIUM PHOSPHATE 5 MG: 10 INJECTION INTRAMUSCULAR; INTRAVENOUS at 14:50

## 2024-01-31 RX ADMIN — TRANEXAMIC ACID 1000 MG: 1 INJECTION, SOLUTION INTRAVENOUS at 15:09

## 2024-01-31 RX ADMIN — ASPIRIN 81 MG: 81 TABLET, COATED ORAL at 20:24

## 2024-01-31 RX ADMIN — KETOROLAC TROMETHAMINE 30 MG: 30 INJECTION INTRAMUSCULAR; INTRAVENOUS at 23:35

## 2024-01-31 RX ADMIN — SODIUM CHLORIDE, SODIUM LACTATE, POTASSIUM CHLORIDE, AND CALCIUM CHLORIDE 100 ML/HR: 600; 310; 30; 20 INJECTION, SOLUTION INTRAVENOUS at 13:12

## 2024-01-31 RX ADMIN — CEFAZOLIN SODIUM 2 G: 2 INJECTION, SOLUTION INTRAVENOUS at 23:35

## 2024-01-31 RX ADMIN — SODIUM CHLORIDE, SODIUM LACTATE, POTASSIUM CHLORIDE, AND CALCIUM CHLORIDE 100 ML/HR: 600; 310; 30; 20 INJECTION, SOLUTION INTRAVENOUS at 18:28

## 2024-01-31 RX ADMIN — CEFAZOLIN 1 G: 1 INJECTION, POWDER, FOR SOLUTION INTRAMUSCULAR; INTRAVENOUS at 15:09

## 2024-01-31 RX ADMIN — CEFAZOLIN SODIUM 2 G: 2 INJECTION, SOLUTION INTRAVENOUS at 15:09

## 2024-01-31 RX ADMIN — SODIUM CHLORIDE, SODIUM LACTATE, POTASSIUM CHLORIDE, AND CALCIUM CHLORIDE: 600; 310; 30; 20 INJECTION, SOLUTION INTRAVENOUS at 15:50

## 2024-01-31 RX ADMIN — PROPOFOL 69.85 MCG/KG/MIN: 10 INJECTION, EMULSION INTRAVENOUS at 15:04

## 2024-01-31 RX ADMIN — MIDAZOLAM 2 MG: 1 INJECTION INTRAMUSCULAR; INTRAVENOUS at 14:45

## 2024-01-31 RX ADMIN — DEXAMETHASONE SODIUM PHOSPHATE 8 MG: 4 INJECTION, SOLUTION INTRAMUSCULAR; INTRAVENOUS at 15:16

## 2024-01-31 RX ADMIN — ROPIVACAINE HYDROCHLORIDE 20 ML: 5 INJECTION, SOLUTION EPIDURAL; INFILTRATION; PERINEURAL at 14:50

## 2024-01-31 RX ADMIN — FENTANYL CITRATE 50 MCG: 50 INJECTION INTRAMUSCULAR; INTRAVENOUS at 14:57

## 2024-01-31 RX ADMIN — TRANEXAMIC ACID 1000 MG: 1 INJECTION, SOLUTION INTRAVENOUS at 16:17

## 2024-01-31 RX ADMIN — ONDANSETRON 4 MG: 2 INJECTION INTRAMUSCULAR; INTRAVENOUS at 16:42

## 2024-01-31 SDOH — SOCIAL STABILITY: SOCIAL INSECURITY: DO YOU FEEL ANYONE HAS EXPLOITED OR TAKEN ADVANTAGE OF YOU FINANCIALLY OR OF YOUR PERSONAL PROPERTY?: NO

## 2024-01-31 SDOH — SOCIAL STABILITY: SOCIAL INSECURITY: DO YOU FEEL UNSAFE GOING BACK TO THE PLACE WHERE YOU ARE LIVING?: NO

## 2024-01-31 SDOH — SOCIAL STABILITY: SOCIAL INSECURITY: DOES ANYONE TRY TO KEEP YOU FROM HAVING/CONTACTING OTHER FRIENDS OR DOING THINGS OUTSIDE YOUR HOME?: NO

## 2024-01-31 SDOH — SOCIAL STABILITY: SOCIAL INSECURITY: ABUSE: ADULT

## 2024-01-31 SDOH — SOCIAL STABILITY: SOCIAL INSECURITY: ARE YOU OR HAVE YOU BEEN THREATENED OR ABUSED PHYSICALLY, EMOTIONALLY, OR SEXUALLY BY ANYONE?: NO

## 2024-01-31 SDOH — SOCIAL STABILITY: SOCIAL INSECURITY: ARE THERE ANY APPARENT SIGNS OF INJURIES/BEHAVIORS THAT COULD BE RELATED TO ABUSE/NEGLECT?: NO

## 2024-01-31 SDOH — SOCIAL STABILITY: SOCIAL INSECURITY: HAS ANYONE EVER THREATENED TO HURT YOUR FAMILY OR YOUR PETS?: NO

## 2024-01-31 SDOH — SOCIAL STABILITY: SOCIAL INSECURITY: HAVE YOU HAD THOUGHTS OF HARMING ANYONE ELSE?: NO

## 2024-01-31 SDOH — SOCIAL STABILITY: SOCIAL INSECURITY: WERE YOU ABLE TO COMPLETE ALL THE BEHAVIORAL HEALTH SCREENINGS?: YES

## 2024-01-31 ASSESSMENT — COGNITIVE AND FUNCTIONAL STATUS - GENERAL
PATIENT BASELINE BEDBOUND: NO
HELP NEEDED FOR BATHING: A LITTLE
TURNING FROM BACK TO SIDE WHILE IN FLAT BAD: A LITTLE
MOVING FROM LYING ON BACK TO SITTING ON SIDE OF FLAT BED WITH BEDRAILS: A LITTLE
DAILY ACTIVITIY SCORE: 19
CLIMB 3 TO 5 STEPS WITH RAILING: A LITTLE
PERSONAL GROOMING: A LITTLE
STANDING UP FROM CHAIR USING ARMS: A LITTLE
MOVING TO AND FROM BED TO CHAIR: A LITTLE
DRESSING REGULAR UPPER BODY CLOTHING: A LITTLE
DRESSING REGULAR LOWER BODY CLOTHING: A LITTLE
MOBILITY SCORE: 18
TOILETING: A LITTLE
WALKING IN HOSPITAL ROOM: A LITTLE

## 2024-01-31 ASSESSMENT — PAIN - FUNCTIONAL ASSESSMENT
PAIN_FUNCTIONAL_ASSESSMENT: 0-10

## 2024-01-31 ASSESSMENT — PAIN SCALES - GENERAL
PAINLEVEL_OUTOF10: 0 - NO PAIN
PAIN_LEVEL: 0
PAINLEVEL_OUTOF10: 0 - NO PAIN
PAINLEVEL_OUTOF10: 3
PAINLEVEL_OUTOF10: 0 - NO PAIN

## 2024-01-31 ASSESSMENT — PATIENT HEALTH QUESTIONNAIRE - PHQ9
SUM OF ALL RESPONSES TO PHQ9 QUESTIONS 1 & 2: 0
2. FEELING DOWN, DEPRESSED OR HOPELESS: NOT AT ALL
1. LITTLE INTEREST OR PLEASURE IN DOING THINGS: NOT AT ALL

## 2024-01-31 ASSESSMENT — LIFESTYLE VARIABLES
HOW OFTEN DO YOU HAVE A DRINK CONTAINING ALCOHOL: NEVER
SUBSTANCE_ABUSE_PAST_12_MONTHS: NO
PRESCIPTION_ABUSE_PAST_12_MONTHS: NO
AUDIT-C TOTAL SCORE: 0
SKIP TO QUESTIONS 9-10: 1
HOW MANY STANDARD DRINKS CONTAINING ALCOHOL DO YOU HAVE ON A TYPICAL DAY: PATIENT DOES NOT DRINK
AUDIT-C TOTAL SCORE: 0
HOW OFTEN DO YOU HAVE 6 OR MORE DRINKS ON ONE OCCASION: NEVER

## 2024-01-31 ASSESSMENT — ACTIVITIES OF DAILY LIVING (ADL)
WALKS IN HOME: NEEDS ASSISTANCE
DRESSING YOURSELF: NEEDS ASSISTANCE
LACK_OF_TRANSPORTATION: NO
TOILETING: NEEDS ASSISTANCE
ASSISTIVE_DEVICE: WALKER
ADEQUATE_TO_COMPLETE_ADL: YES
HEARING - RIGHT EAR: FUNCTIONAL
FEEDING YOURSELF: INDEPENDENT
HEARING - LEFT EAR: FUNCTIONAL
PATIENT'S MEMORY ADEQUATE TO SAFELY COMPLETE DAILY ACTIVITIES?: YES
JUDGMENT_ADEQUATE_SAFELY_COMPLETE_DAILY_ACTIVITIES: YES
BATHING: NEEDS ASSISTANCE
LACK_OF_TRANSPORTATION: NO
GROOMING: NEEDS ASSISTANCE

## 2024-01-31 ASSESSMENT — PAIN DESCRIPTION - LOCATION
LOCATION: KNEE
LOCATION: KNEE

## 2024-01-31 ASSESSMENT — COLUMBIA-SUICIDE SEVERITY RATING SCALE - C-SSRS
2. HAVE YOU ACTUALLY HAD ANY THOUGHTS OF KILLING YOURSELF?: NO
1. IN THE PAST MONTH, HAVE YOU WISHED YOU WERE DEAD OR WISHED YOU COULD GO TO SLEEP AND NOT WAKE UP?: NO
6. HAVE YOU EVER DONE ANYTHING, STARTED TO DO ANYTHING, OR PREPARED TO DO ANYTHING TO END YOUR LIFE?: NO
2. HAVE YOU ACTUALLY HAD ANY THOUGHTS OF KILLING YOURSELF?: NO
1. IN THE PAST MONTH, HAVE YOU WISHED YOU WERE DEAD OR WISHED YOU COULD GO TO SLEEP AND NOT WAKE UP?: NO

## 2024-01-31 ASSESSMENT — PAIN DESCRIPTION - ORIENTATION
ORIENTATION: LEFT
ORIENTATION: LEFT

## 2024-01-31 NOTE — ANESTHESIA POSTPROCEDURE EVALUATION
Patient: Phyllis Castro    Procedure Summary       Date: 01/31/24 Room / Location: [facility] / [facility]    Anesthesia Start: 1454 Anesthesia Stop: 1651    Procedure: Arthroplasty Total Knee (SIL ROBOTIC ASSISTED DEVICE, ROXY TRIATHLON) (Left: Knee) Diagnosis:       Primary osteoarthritis of left knee      (M17.12)    Surgeons: Alcides Helton DO Responsible Provider: Emmy Cobian MD    Anesthesia Type: regional, spinal ASA Status: 3            Anesthesia Type: regional, spinal    Vitals Value Taken Time   /98 01/31/24 1650   Temp 36 °C (96.8 °F) 01/31/24 1650   Pulse 93 01/31/24 1650   Resp 16 01/31/24 1650   SpO2 99 % 01/31/24 1650       Anesthesia Post Evaluation    Patient location during evaluation: PACU  Patient participation: complete - patient participated  Level of consciousness: awake  Pain score: 0  Pain management: adequate  Multimodal analgesia pain management approach  Airway patency: patent  Two or more strategies used to mitigate risk of obstructive sleep apnea  Cardiovascular status: acceptable  Respiratory status: acceptable  Hydration status: acceptable  Postoperative Nausea and Vomiting: none  Comments: No Nausea      There were no known notable events for this encounter.

## 2024-01-31 NOTE — ANESTHESIA PROCEDURE NOTES
Peripheral Block    Patient location during procedure: pre-op  Start time: 1/31/2024 2:48 PM  End time: 1/31/2024 2:50 PM  Reason for block: at surgeon's request and post-op pain management  Staffing  Performed: CRNA   Authorized by: Emmy Cobian MD    Performed by: BEV Gordillo  Preanesthetic Checklist  Completed: patient identified, IV checked, site marked, risks and benefits discussed, surgical consent, monitors and equipment checked, pre-op evaluation and timeout performed   Timeout performed at: 1/31/2024 2:44 PM  Peripheral Block  Patient position: laying flat  Prep: ChloraPrep  Patient monitoring: heart rate, cardiac monitor and continuous pulse ox  Block type: adductor canal  Laterality: left  Injection technique: single-shot  Guidance: ultrasound guided  Local infiltration: ropivacaine  Infiltration strength: 0.5 %  Dose: 20 mL  Needle  Needle gauge: 20 G  Needle length: 10 cm  Needle localization: ultrasound guidance     image stored in chart  Assessment  Injection assessment: negative aspiration for heme, no paresthesia on injection, incremental injection and local visualized surrounding nerve on ultrasound  Paresthesia pain: none  Heart rate change: no  Slow fractionated injection: no

## 2024-01-31 NOTE — NURSING NOTE
Admitted to room.203 from surgery .Oriented to room. Call light given. at bedside.Left knee dressing intact with no noted drainage. Pulses 2+ Pink and warm to touch. Denies operative pain at present.

## 2024-01-31 NOTE — ANESTHESIA PREPROCEDURE EVALUATION
Patient: Phyllis Castro    Procedure Information       Date/Time: 24    Procedure: Arthroplasty Total Knee (SIL ROBOTIC ASSISTED DEVICE, Groupspeak TRIATHLON) (Left: Knee)    Location: [facility] /  [facility]    Surgeons: Alcides Helton, DO            Relevant Problems   Anesthesia   (+) Delayed emergence from anesthesia      Cardiovascular (within normal limits)      Endocrine (within normal limits)      GI (within normal limits)      /Renal (within normal limits)      Neuro/Psych (within normal limits)      Pulmonary (within normal limits)      GI/Hepatic (within normal limits)      Hematology (within normal limits)      Musculoskeletal   (+) Primary osteoarthritis of left knee      Eyes, Ears, Nose, and Throat (within normal limits)      Infectious Disease (within normal limits)      Other   (+) Arthritis     Past Medical History:   Diagnosis Date    Acute vaginitis 2015    Bacterial vaginosis    Anxiety     Candidiasis, unspecified 2015    Yeast infection    Delayed emergence from general anesthesia     Depression     Encounter for screening mammogram for malignant neoplasm of breast 01/15/2016    Visit for screening mammogram    Encounter for screening, unspecified 2015    Screening    Personal history of other diseases of the female genital tract     History of ovarian cyst    Personal history of other diseases of the female genital tract 2015    History of menorrhagia    Personal history of other endocrine, nutritional and metabolic disease     History of dehydration    Plantar fascial fibromatosis 2015    Plantar fasciitis    Sleep apnea     uses cpap    Unspecified benign mammary dysplasia of unspecified breast     Atypical lobular hyperplasia of breast    Unspecified ovarian cyst, left side 2015    Cyst of left ovary     Past Surgical History:   Procedure Laterality Date     SECTION, CLASSIC  2015     Section x2    FOOT SURGERY Right  05/27/2015    Foot Repair    HYSTERECTOMY      with BSO    OOPHORECTOMY      OTHER SURGICAL HISTORY Bilateral 05/27/2015    Bx Breast Percutan Needle Core Use Imag Guide (Stereotactic)    OTHER SURGICAL HISTORY Left     triggere finger release    OTHER SURGICAL HISTORY Right     thimb tendon release    OTHER SURGICAL HISTORY Bilateral     breast mass removed    OTHER SURGICAL HISTORY Right     right ankle ORIF w/ HW    ROTATOR CUFF REPAIR Right     TONSILLECTOMY  01/06/2015    Tonsillectomy    TUBAL LIGATION  01/06/2015    Tubal Ligation    UMBILICAL HERNIA REPAIR  01/06/2015    Umbilical Hernia Repair       No Known Allergies    Clinical information reviewed:                   NPO Detail:  No data recorded     Physical Exam    Airway  Mallampati: II  TM distance: >3 FB  Neck ROM: full     Cardiovascular   Rhythm: regular  Rate: normal     Dental    Pulmonary   Breath sounds clear to auscultation     Abdominal            Anesthesia Plan    History of general anesthesia?: yes  History of complications of general anesthesia?: no    ASA 3     regional and spinal     intravenous induction   Postoperative administration of opioids is intended.  Anesthetic plan and risks discussed with patient.  Use of blood products discussed with patient who.

## 2024-01-31 NOTE — CARE PLAN
The patient's goals for the shift include  pain control.    The clinical goals for the shift include  pain control.

## 2024-01-31 NOTE — OP NOTE
Arthroplasty Total Knee (SIL ROBOTIC ASSISTED DEVICE, ROXY TRIATHLON) (L) Operative Note     Date: 2024  OR Location: [facility]    Name: Phyllis Castro, : 1960, Age: 63 y.o., MRN: [id number], Sex: female    Diagnosis  Pre-op Diagnosis     * Primary osteoarthritis of left knee [M17.12] Post-op Diagnosis     * Primary osteoarthritis of left knee [M17.12]     Procedures  Arthroplasty Total Knee (SIL ROBOTIC ASSISTED DEVICE, ROXY TRIATHLON)  88708 - [place] ARTHRP KNE CONDYLE&PLATU MEDIAL&LAT COMPARTMENTS      Surgeons      * Alcides Helton - Primary    Resident/Fellow/Other Assistant:  Surgeon(s) and Role:    Procedure Summary  Anesthesia: General  ASA: III  Anesthesia Staff: Anesthesiologist: Emmy Cobian MD  CRNA: PAULINO Gordillo-CRNA  Estimated Blood Loss: 15 mL  Intra-op Medications:   Administrations occurring from 1500 to 1730 on 24:   Medication Name Total Dose   ropivacaine-epinephrine-clonidine-ketorolac 2.46-0.005- 0.0008-0.3mg/mL periarticular syringe 50 mL   lactated Ringer's infusion Cannot be calculated   ceFAZolin in dextrose (iso-os) (Ancef) IVPB 2 g 2 g              Anesthesia Record               Intraprocedure I/O Totals          Intake    Tranexamic Acid 0.00 mL    The total shown is the total volume documented since Anesthesia Start was filed.    Propofol Drip 0.00 mL    The total shown is the total volume documented since Anesthesia Start was filed.    lactated Ringer's infusion 1000.00 mL    Total Intake 1000 mL          Specimen: No specimens collected     Staff:   Circulator: Laura Banks RN  Scrub Person: Micki Arriola; Laura Brito; Sangeetha Bustillo RN; Melani Torrez         Drains and/or Catheters: * None in log *    Tourniquet Times:   * Missing tourniquet times found for documented tourniquets in lo *     Implants:  Implants       Type Name Action Serial No.       3.2MM X 110MM ROXY BONE PIN  Used, Not Implanted       3.2MM X 140MM ROXY BONE  PIN Used, Not Implanted      Joint BASEPLATE, TRIATHLON TRITANIUM, SIZE 4, 46MM - [id number] Implanted      Joint COMPONENT, CR FEMORAL, P4, BEADED W/PA, LEFT - [id number] Implanted      Joint PATELLA, TRIATHLON, ASYMMETRIC, SIZE A29 - [id number] Implanted      Joint INSERT, TIBIAL, X3 TRIATHLON CS, SZ-4 11MM - [id number] Implanted               Findings: Preoperative x-rays revealed mild varus deformity, severe patellofemoral arthritis.  After dissection down to the knee profound patellofemoral arthrosis was encountered, adequate bone stock was available for resurfacing of the patella.  After placement of well-fixed total knee arthroplasty excellent range of motion, excellent stability throughout the arc of motion and midline patellar tracking was achieved      Indications: Phyllis Castro is an 63 y.o. female who is having surgery for M17.12.  Here today for total knee arthroplasty    The patient was seen in the preoperative area. The risks, benefits, complications, treatment options, non-operative alternatives, expected recovery and outcomes were discussed with the patient. The possibilities of reaction to medication, pulmonary aspiration, injury to surrounding structures, bleeding, recurrent infection, the need for additional procedures, failure to diagnose a condition, and creating a complication requiring transfusion or operation were discussed with the patient. The patient concurred with the proposed plan, giving informed consent.  The site of surgery was properly noted/marked if necessary per policy. The patient has been actively warmed in preoperative area. Preoperative antibiotics have been ordered and given within 1 hours of incision. Venous thrombosis prophylaxis have been ordered including unilateral sequential compression device    Procedure Details: The patient was correctly identified in preoperative holding, appropriate regional blocks were provided by the department of anesthesia.  Patient was taken  to the operative suite and placed in the seated position, a spinal anesthetic was administered.  The patient was then placed in the supine position.  Examination under anesthesia was carried forth.  A well-padded tourniquet was placed on the left upper thigh.  The left lower extremity was sterilely prepped and draped in the usual surgical fashion, Ioban drape and leg assistant employed.  A sterile Esmarch was used to exsanguinate the limb and the tourniquet was elevated to 250 mmHg.  Standard midline incision was made followed by a medial parapatellar arthrotomy.  A minimal medial release was performed high on the tibia to allow placement of retractors, fat pad was excised.  Femoral and tibial checkpoints were then placed followed by pelvic and tibial array (placed intra incisional).   Hip center rotation was then registered followed by location of medial and lateral malleoli.  Femoral and tibial registration was then performed with excellent accuracy.  The ACL was released off of the tibia, anterior horn of the lateral meniscus excised.  The digital tensiometer was then utilized to tailor the osseous resections to the soft tissue tensioning.  The goal of creating a medial pivot with the knee symmetric medially and laterally in extension, medially in flexion and extension, slightly loose on the lateral side and flexion.   The femoral and tibial cuts were then made, the cut osseous fragments were removed from the knee.  Meniscal cartilages excised, posterior osteophytes removed.  Trial implants were then placed with a best fit size 4 tibia, size 4 femur and a 11 mm polyethylene.  The digital tensiometer revealed excellent stability both in flexion and extension with approximately 0.5 mm of lateral laxity in flexion.  The patella was then cut and sized.  Patellar lug holes were then drilled for press-fit implant and a trial patella was affixed.  Excellent range of motion, excellent stability throughout the arc of  motion.  Tibial rotation was then set, the baseplate pinned anteriorly.  The knee was flexed up.  Lug holes drilled in the femur for press-fit plant.  Proximal tibial preparation was then completed in conventional fashion for press-fit technique.  The knee was then soaked with Irrisept and thoroughly irrigated.  The posterior capsule and soft tissue envelope injected with a local anesthetic cocktail.  Any bleeding vessels were coagulated with electrocautery.  The cut osseous surfaces were thoroughly dried and clear of any blood, fat or saline.   The tibial component was impacted onto the cut surface of the tibia.  The polyethylene was impacted onto the baseplate.  The femoral component was then impacted onto the cut surface of the femur.  The knee was then reduced and placed in full extension.  The patellar component was then press-fit with the appropriate clamp.    The knee once again was soaked with Irrisept and thoroughly irrigated.  The arthrotomy was then closed proximally and distally with #1 Vicryl suture, from the proximal pole of the patella to the distal pole of the patella with interrupted figure-of-eight #2 FiberWire suture.  A watertight closure was achieved.  The skin and subcutaneous tissues were closed in typical layered fashion.  Skin edges were reapproximated with skin glue followed by Steri-Strips with adhesive.  A Mepilex Ag dressing was applied followed by a long leg GONZALO hose.  The tourniquet was then deflated with excellent return of blood flow to the extremity.  The patient was lightened from anesthesia and taken to recovery room in stable condition without complication  Complications:  None; patient tolerated the procedure well.    Disposition: PACU - hemodynamically stable.  Condition: stable         Additional Details: none    Attending Attestation:     Alcides Helton  Phone Number: [phone]

## 2024-01-31 NOTE — ANESTHESIA PROCEDURE NOTES
Spinal Block    Patient location during procedure: OR  Start time: 1/31/2024 2:55 PM  End time: 1/31/2024 2:58 PM  Reason for block: primary anesthetic and at surgeon's request  Staffing  Performed: CRNA   Authorized by: Emmy Cobian MD    Performed by: BEV Gordillo    Preanesthetic Checklist  Completed: patient identified, IV checked, risks and benefits discussed, surgical consent, pre-op evaluation, timeout performed and sterile techniques followed  Block Timeout  RN/Licensed healthcare professional reads aloud to the Anesthesia provider and entire team: Patient identity, procedure with side and site, patient position, and as applicable the availability of implants/special equipment/special requirements.    Timeout performed at: 1/31/2024 2:55 PM  Spinal Block  Patient position: sitting  Prep: Betadine  Sterility prep: gloves, drape, cap, mask and hand hygiene  Sedation level: light sedation  Patient monitoring: blood pressure, continuous pulse oximetry and heart rate  Approach: midline  Vertebral space: L4-5  Injection technique: single-shot  Needle  Needle type: pencil-point   Needle gauge: 25 G  Needle length: 3.5 in  Free flowing CSF: yes    Assessment  Sensory level: T10 bilateral  Block outcome: patient comfortable  Procedure assessment: patient tolerated procedure well with no immediate complications  Additional Notes  Lot [id number]  exp 2026-01-31

## 2024-01-31 NOTE — NURSING NOTE
Patient arrived from PACU  Patient's vitals were done   Dinner was ordered   Call light within reach

## 2024-02-01 VITALS
TEMPERATURE: 96.8 F | RESPIRATION RATE: 16 BRPM | OXYGEN SATURATION: 97 % | SYSTOLIC BLOOD PRESSURE: 135 MMHG | HEIGHT: 69 IN | HEART RATE: 78 BPM | DIASTOLIC BLOOD PRESSURE: 67 MMHG | BODY MASS INDEX: 38.96 KG/M2 | WEIGHT: 263.01 LBS

## 2024-02-01 LAB
ANION GAP SERPL CALC-SCNC: 13 MMOL/L (ref 10–20)
BUN SERPL-MCNC: 10 MG/DL (ref 6–23)
CALCIUM SERPL-MCNC: 8.7 MG/DL (ref 8.6–10.3)
CHLORIDE SERPL-SCNC: 105 MMOL/L (ref 98–107)
CO2 SERPL-SCNC: 23 MMOL/L (ref 21–32)
CREAT SERPL-MCNC: 0.48 MG/DL (ref 0.5–1.05)
EGFRCR SERPLBLD CKD-EPI 2021: >90 ML/MIN/1.73M*2
ERYTHROCYTE [DISTWIDTH] IN BLOOD BY AUTOMATED COUNT: 12.1 % (ref 11.5–14.5)
GLUCOSE SERPL-MCNC: 152 MG/DL (ref 74–99)
HCT VFR BLD AUTO: 39 % (ref 36–46)
HGB BLD-MCNC: 13.2 G/DL (ref 12–16)
MCH RBC QN AUTO: 29 PG (ref 26–34)
MCHC RBC AUTO-ENTMCNC: 33.8 G/DL (ref 32–36)
MCV RBC AUTO: 86 FL (ref 80–100)
NRBC BLD-RTO: ABNORMAL /100{WBCS}
PLATELET # BLD AUTO: 216 X10*3/UL (ref 150–450)
POTASSIUM SERPL-SCNC: 4.3 MMOL/L (ref 3.5–5.3)
RBC # BLD AUTO: 4.55 X10*6/UL (ref 4–5.2)
SODIUM SERPL-SCNC: 137 MMOL/L (ref 136–145)
WBC # BLD AUTO: 17.8 X10*3/UL (ref 4.4–11.3)

## 2024-02-01 PROCEDURE — 96376 TX/PRO/DX INJ SAME DRUG ADON: CPT

## 2024-02-01 PROCEDURE — 2500000004 HC RX 250 GENERAL PHARMACY W/ HCPCS (ALT 636 FOR OP/ED): Performed by: ORTHOPAEDIC SURGERY

## 2024-02-01 PROCEDURE — 97116 GAIT TRAINING THERAPY: CPT | Mod: GP

## 2024-02-01 PROCEDURE — G0378 HOSPITAL OBSERVATION PER HR: HCPCS

## 2024-02-01 PROCEDURE — 2500000004 HC RX 250 GENERAL PHARMACY W/ HCPCS (ALT 636 FOR OP/ED): Performed by: PHARMACIST

## 2024-02-01 PROCEDURE — 97110 THERAPEUTIC EXERCISES: CPT | Mod: GP

## 2024-02-01 PROCEDURE — 80048 BASIC METABOLIC PNL TOTAL CA: CPT | Performed by: ORTHOPAEDIC SURGERY

## 2024-02-01 PROCEDURE — 97161 PT EVAL LOW COMPLEX 20 MIN: CPT | Mod: GP

## 2024-02-01 PROCEDURE — 36415 COLL VENOUS BLD VENIPUNCTURE: CPT | Performed by: ORTHOPAEDIC SURGERY

## 2024-02-01 PROCEDURE — 2500000001 HC RX 250 WO HCPCS SELF ADMINISTERED DRUGS (ALT 637 FOR MEDICARE OP): Performed by: ORTHOPAEDIC SURGERY

## 2024-02-01 PROCEDURE — 85027 COMPLETE CBC AUTOMATED: CPT | Performed by: ORTHOPAEDIC SURGERY

## 2024-02-01 PROCEDURE — 2500000004 HC RX 250 GENERAL PHARMACY W/ HCPCS (ALT 636 FOR OP/ED): Performed by: HOSPITALIST

## 2024-02-01 RX ADMIN — ASPIRIN 81 MG: 81 TABLET, COATED ORAL at 09:12

## 2024-02-01 RX ADMIN — KETOROLAC TROMETHAMINE 30 MG: 30 INJECTION INTRAMUSCULAR; INTRAVENOUS at 05:49

## 2024-02-01 RX ADMIN — CEFAZOLIN SODIUM 2 G: 2 INJECTION, SOLUTION INTRAVENOUS at 06:26

## 2024-02-01 RX ADMIN — VENLAFAXINE HYDROCHLORIDE 75 MG: 75 CAPSULE, EXTENDED RELEASE ORAL at 09:13

## 2024-02-01 ASSESSMENT — PAIN DESCRIPTION - LOCATION: LOCATION: KNEE

## 2024-02-01 ASSESSMENT — PAIN SCALES - GENERAL
PAINLEVEL_OUTOF10: 0 - NO PAIN
PAINLEVEL_OUTOF10: 3

## 2024-02-01 ASSESSMENT — ACTIVITIES OF DAILY LIVING (ADL)
ADLS_ADDRESSED: NO
ADL_ASSISTANCE: INDEPENDENT

## 2024-02-01 ASSESSMENT — COGNITIVE AND FUNCTIONAL STATUS - GENERAL
CLIMB 3 TO 5 STEPS WITH RAILING: A LITTLE
DRESSING REGULAR LOWER BODY CLOTHING: A LITTLE
MOBILITY SCORE: 18
DAILY ACTIVITIY SCORE: 21
STANDING UP FROM CHAIR USING ARMS: A LITTLE
TOILETING: A LITTLE
DAILY ACTIVITIY SCORE: 21
DRESSING REGULAR LOWER BODY CLOTHING: A LITTLE
MOVING TO AND FROM BED TO CHAIR: A LITTLE
MOBILITY SCORE: 18
HELP NEEDED FOR BATHING: A LITTLE
TOILETING: A LITTLE
HELP NEEDED FOR BATHING: A LITTLE
CLIMB 3 TO 5 STEPS WITH RAILING: A LITTLE
MOBILITY SCORE: 24
TURNING FROM BACK TO SIDE WHILE IN FLAT BAD: A LITTLE
MOVING FROM LYING ON BACK TO SITTING ON SIDE OF FLAT BED WITH BEDRAILS: A LITTLE
MOVING FROM LYING ON BACK TO SITTING ON SIDE OF FLAT BED WITH BEDRAILS: A LITTLE
TURNING FROM BACK TO SIDE WHILE IN FLAT BAD: A LITTLE
WALKING IN HOSPITAL ROOM: A LITTLE
MOVING TO AND FROM BED TO CHAIR: A LITTLE
WALKING IN HOSPITAL ROOM: A LITTLE
STANDING UP FROM CHAIR USING ARMS: A LITTLE

## 2024-02-01 ASSESSMENT — PAIN SCALES - WONG BAKER: WONGBAKER_NUMERICALRESPONSE: NO HURT

## 2024-02-01 ASSESSMENT — PAIN - FUNCTIONAL ASSESSMENT
PAIN_FUNCTIONAL_ASSESSMENT: FLACC (FACE, LEGS, ACTIVITY, CRY, CONSOLABILITY)
PAIN_FUNCTIONAL_ASSESSMENT: 0-10
PAIN_FUNCTIONAL_ASSESSMENT: 0-10

## 2024-02-01 ASSESSMENT — PAIN DESCRIPTION - ORIENTATION: ORIENTATION: LEFT

## 2024-02-01 NOTE — PROGRESS NOTES
Physical Therapy    Physical Therapy Evaluation & Treatment    Patient Name: Phyllis Castro  MRN: [id number]  Today's Date: 2/1/2024   Time Calculation  Start Time: 0945  Stop Time: 1031  Time Calculation (min): 46 min    Assessment/Plan   PT Assessment  PT Assessment Results: Decreased mobility, Orthopedic restrictions, Pain  Rehab Prognosis: Excellent  Evaluation/Treatment Tolerance: Patient tolerated treatment well  Medical Staff Made Aware: Yes  Barriers to Participation: Ability to acquire knowledge, Access to adaptive/assistive products, Attitude of self, Capable of completing ADLs semi/independent, Coping skills, Insight into problems, Rehab experience, Support of Caregivers, Premorbid level of function  End of Session Communication: Bedside nurse  Assessment Comment: PT eval low. Pt presenting to eval with minor deficits in funcitonal mobility and increased post op pain in L knee however is expected to progress quickly towards goals. Pt understanding of HEP and L kneeprecautions. PT recommends outpatient PT with assist as needed  End of Session Patient Position: Bed, 3 rail up, Alarm off, not on at start of session (RN aware of status, ice on L knee; call bell in reach. all needs met)   IP OR SWING BED PT PLAN  Inpatient or Swing Bed: Inpatient  PT Plan  Treatment/Interventions: Bed mobility, Transfer training, Gait training, Stair training, Endurance training, Home exercise program, Positioning, Therapeutic exercise  PT Plan: Skilled PT  PT Frequency: BID  PT Discharge Recommendations: Low intensity level of continued care  Equipment Recommended upon Discharge: Wheeled walker  PT Recommended Transfer Status: Assistive device, Stand by assist  PT - OK to Discharge: Yes      Subjective     General Visit Information:  General  Reason for Referral: P presenting to [facility] on 1/31/24 for L TKa by .  Past Medical History Relevant to Rehab: OA, anxiety/depression, vertigo, BONITA + CPAP use, plantarfascitis,  hernia repair, tubal ligation, hysterectomy and oopherectomy, foot surgery  Family/Caregiver Present: No  Prior to Session Communication: Bedside nurse  Patient Position Received: Up in chair  General Comment: cleared by RN and agreeable to session.  Home Living:  Home Living  Type of Home: House  Lives With: Spouse  Home Adaptive Equipment: Walker rolling or standard, Cane  Home Layout: Two level, Stairs to alternate level with rails  Alternate Level Stairs-Number of Steps: 3 steps into kitchen with grab bar and then 10 steps to bedroom/bathroom upstairs with railing  Home Access: Level entry  Bathroom Shower/Tub: Walk-in shower  Prior Level of Function:  Prior Function Per Pt/Caregiver Report  Level of [place]: Independent with ADLs and functional transfers  ADL Assistance: Independent  Homemaking Assistance: Independent  Ambulatory Assistance: Independent  Vocational: Retired  Precautions:  Precautions  LE Weight Bearing Status: Weight Bearing as Tolerated  Post-Surgical Precautions: Left total knee precautions  Vital Signs:       Objective   Pain:  Pain Assessment  Pain Assessment: 0-10  Pain Score: 3  Pain Type: Surgical pain  Pain Location: Knee  Pain Orientation: Left  Pain Interventions: Cold applied, Elevated, Rest, Ambulation/increased activity  Response to Interventions: continued therapy, noting mild pain changes in posterior knee  Cognition:  Cognition  Overall Cognitive Status: Within Functional Limits    General Assessments:  General Observation  General Observation: dressing dry and intact on L knee               Sensation  Light Touch: No apparent deficits    Coordination  Movements are Fluid and Coordinated: Yes    Postural Control  Postural Control: Within Functional Limits    Static Sitting Balance  Static Sitting-Balance Support: No upper extremity supported  Static Sitting-Level of Assistance: Independent  Dynamic Sitting Balance  Dynamic Sitting-Balance Support: No upper extremity  supported  Dynamic Sitting-Comments: independent    Static Standing Balance  Static Standing-Balance Support: No upper extremity supported  Static Standing-Level of Assistance: Distant supervision  Static Standing-Comment/Number of Minutes: with RW  Dynamic Standing Balance  Dynamic Standing-Balance Support: Bilateral upper extremity supported  Dynamic Standing-Comments: distant supervision with RW  Functional Assessments:  Bed Mobility  Bed Mobility: Yes  Bed Mobility 1  Bed Mobility 1: Sitting to supine, Scooting  Level of Assistance 1: Independent    Transfers  Transfer: Yes  Transfer 1  Technique 1: Sit to stand, Stand to sit  Transfer Device 1: Walker  Transfer Level of Assistance 1: Modified independent  Trials/Comments 1: RW present for stability as needed; mod I with good use of hand placement on armrests for support; no buckle or lOB.    Ambulation/Gait Training  Ambulation/Gait Training Performed: Yes  Ambulation/Gait Training 1  Device 1: Rolling walker  Assistance 1: Distant supervision  Quality of Gait 1: Antalgic  Comments/Distance (ft) 1: Pt ambulated 150 feetx2 with DSx1 and use of RW without buckle or lOB. pt displaying antalgic gait but showing good phil, reciprocal stepping.    Stairs  Stairs: Yes  Stairs  Rails 1: Left  Device 1: Railing, Single point cane  Assistance 1: Close supervision  Comment/Number of Steps 1: pt completed 3 steps with railing and cane at CSx1 for cueing for step to pattern however no hands on assist needed; pt stable and tolearted well.  Extremity/Trunk Assessments:  RUE   RUE : Within Functional Limits  LUE   LUE: Within Functional Limits  RLE   RLE : Within Functional Limits  LLE   LLE : Within Functional Limits  Treatments:  Therapeutic Exercise  Therapeutic Exercise Performed: Yes  B ankle pumps, L quad sets, L gluteal sets, L heel slides, L SAQ, L hip abduction, and L SLR x 10 reps each.    Outcome Measures:  [facility] Basic Mobility  Turning from your back to your  side while in a flat bed without using bedrails: None  Moving from lying on your back to sitting on the side of a flat bed without using bedrails: None  Moving to and from bed to chair (including a wheelchair): None  Standing up from a chair using your arms (e.g. wheelchair or bedside chair): None  To walk in hospital room: None  Climbing 3-5 steps with railing: None  Basic Mobility - Total Score: 24    Encounter Problems       Encounter Problems (Active)       Pain - Adult          Safety       LTG - Patient will adhere to hip precautions during ADL's and transfers       Start:  01/31/24    Expected End:  02/01/24            LTG - Patient will demonstrate safety requirements appropriate to situation/environment       Start:  01/31/24    Expected End:  02/01/24            LTG - Patient will utilize safety techniques       Start:  01/31/24    Expected End:  02/01/24            STG - Patient locks brakes on wheelchair       Start:  01/31/24    Expected End:  02/01/24            STG - Patient uses call light consistently to request assistance with transfers       Start:  01/31/24    Expected End:  02/01/24            STG - Patient uses gait belt during all transfers       Start:  01/31/24    Expected End:  02/01/24            Goal 1       Start:  01/31/24    Expected End:  02/01/24            Goal 2       Start:  01/31/24    Expected End:  02/01/24            Goal 3       Start:  01/31/24    Expected End:  02/01/24                   Education Documentation  Handouts, taught by Charissa Lara PT at 2/1/2024 12:52 PM.  Learner: Patient  Readiness: Acceptance  Method: Explanation, Demonstration, Handout  Response: Verbalizes Understanding, Demonstrated Understanding    Precautions, taught by Charissa Lara, PT at 2/1/2024 12:52 PM.  Learner: Patient  Readiness: Acceptance  Method: Explanation, Demonstration, Handout  Response: Verbalizes Understanding, Demonstrated Understanding    Body Mechanics, taught by Charissa CHAVES  Jane PT at 2/1/2024 12:52 PM.  Learner: Patient  Readiness: Acceptance  Method: Explanation, Demonstration, Handout  Response: Verbalizes Understanding, Demonstrated Understanding    Home Exercise Program, taught by Charissa Lara PT at 2/1/2024 12:52 PM.  Learner: Patient  Readiness: Acceptance  Method: Explanation, Demonstration, Handout  Response: Verbalizes Understanding, Demonstrated Understanding    Mobility Training, taught by Charissa Lara PT at 2/1/2024 12:52 PM.  Learner: Patient  Readiness: Acceptance  Method: Explanation, Demonstration, Handout  Response: Verbalizes Understanding, Demonstrated Understanding    Education Comments  No comments found.    Charissa Lara, PT, DPT

## 2024-02-01 NOTE — NURSING NOTE
Assumed care of patient at 1900. Patient lying in bed, would like to get out to use bedside commode at this time. She is in good spirits, states she has no pain at this time. Her pushes and pulls are strong and she has no numbness. She has not worked with physical therapy yet. No other concerns at this time, safety measures in place, call light and personal belongings within reach.

## 2024-02-01 NOTE — PROGRESS NOTES
"  Progress Note:    Phyllis Castro is a 63 y.o. female on day 2 of admission presenting with Primary osteoarthritis of left knee.    Subjective   Mrs. Castro reports nearly no pain.  States she slept on and off, with interruptions not due to knee pain.     ROS  Constitutional:  Alert, oriented  HEENT: Denies headache, vision changes, hearing change, loss of taste or smell. Does complain of sore throat (post op)   Neck: Denies swallowing difficulty, swelling, new stiffness/pain  CV: Denies CP, Palpitations, chest wall pain  Resp: No cough, wheeze, dyspnea  Abdomen: Denies abdominal pain, cramping, constipation, diarrhea  : No dysuria, hematuria, discharge  Musculoskeletal: Aches over all joints (not new). Generalized weakness 2/2 pain  Extremities: Swelling of RLE  Neuro: No focal deficits other than mild [place]    Scheduled medications  aspirin, 81 mg, oral, BID  ketorolac, 30 mg, intravenous, q6h  polyethylene glycol, 17 g, oral, Daily  venlafaxine XR, 75 mg, oral, Daily    Continuous medications  lactated Ringer's, 100 mL/hr, Last Rate: Stopped (01/31/24 1651)  lactated Ringer's, 100 mL/hr, Last Rate: Stopped (02/01/24 0215)    PRN medications  PRN medications: acetaminophen, diphenhydrAMINE, HYDROmorphone, melatonin, naloxone, ondansetron ODT **OR** ondansetron, oxyCODONE-acetaminophen, oxyCODONE-acetaminophen, oxyCODONE-acetaminophen, oxygen, oxygen      Physical Exam  General: No acute distress, alert & oriented  Cardiac: RRR, NL S1 and S2, no murmurs, rubs or gallops  Pulmonary: Lungs clear to auscultation bilaterally, no wheezes, rhales or rhonchi  Abdomen: Soft, non-tender, non-distended  Extremities: No clubbing , cyanosis or edema.     Last Recorded Vitals  Blood pressure 135/67, pulse 78, temperature 36 °C (96.8 °F), temperature source Temporal, resp. rate 16, height 1.753 m (5' 9\"), weight 119 kg (263 lb 0.1 oz), SpO2 97 %.    Scheduled medications   Medication Dose Route Frequency    aspirin  81 mg " oral BID    ketorolac  30 mg intravenous q6h    polyethylene glycol  17 g oral Daily    venlafaxine XR  75 mg oral Daily       Relevant Results  Results from last 7 days   Lab Units 02/01/24  0549   WBC AUTO x10*3/uL 17.8*   HEMOGLOBIN g/dL 13.2   HEMATOCRIT % 39.0   PLATELETS AUTO x10*3/uL 216      Results from last 7 days   Lab Units 02/01/24  0549   SODIUM mmol/L 137   POTASSIUM mmol/L 4.3   CHLORIDE mmol/L 105   CO2 mmol/L 23   BUN mg/dL 10   CREATININE mg/dL 0.48*   GLUCOSE mg/dL 152*   CALCIUM mg/dL 8.7      Results for orders placed or performed during the hospital encounter of 01/31/24 (from the past 24 hour(s))   CBC   Result Value Ref Range    WBC 17.8 (H) 4.4 - 11.3 x10*3/uL    nRBC      RBC 4.55 4.00 - 5.20 x10*6/uL    Hemoglobin 13.2 12.0 - 16.0 g/dL    Hematocrit 39.0 36.0 - 46.0 %    MCV 86 80 - 100 fL    MCH 29.0 26.0 - 34.0 pg    MCHC 33.8 32.0 - 36.0 g/dL    RDW 12.1 11.5 - 14.5 %    Platelets 216 150 - 450 x10*3/uL   Basic metabolic panel   Result Value Ref Range    Glucose 152 (H) 74 - 99 mg/dL    Sodium 137 136 - 145 mmol/L    Potassium 4.3 3.5 - 5.3 mmol/L    Chloride 105 98 - 107 mmol/L    Bicarbonate 23 21 - 32 mmol/L    Anion Gap 13 10 - 20 mmol/L    Urea Nitrogen 10 6 - 23 mg/dL    Creatinine 0.48 (L) 0.50 - 1.05 mg/dL    eGFR >90 >60 mL/min/1.73m*2    Calcium 8.7 8.6 - 10.3 mg/dL      Assessment/Plan   1) Osteoarthritis Left Knee: POD#! Left TKR   Perioperative antibiotics, dressing care, pain meds, PT/OT per Orthopaedics   Patient seen, examined. Notes, labs, vitals reviewed.   The patient is medically cleared for discharge, pending  PT clearance     2) DVT Prophylaxis:   Rx: Aspirin 81 mg BID through Feb 14. Stop date: Feb 15.      3) Depression;   Stable   Rx; Continue Venlafaxine 75 mg daily      4) Prediabetes:   HbA1c 6.1 (Jan 18, 2024). The patient is unaware of her dx   Advised: follow up w/ PCP within 2 wks to further discuss     5) Disposition:   Medication Reconciliation  reviewed, completed   Anticipate discharge later today after PT clearance.          I spent 35 minutes in the professional and overall care of this patient.      Christiano Salmeron MD

## 2024-02-01 NOTE — CARE PLAN
RN TCC met with patient at the bedside to complete assessment. (SEE EPIC)  63 yr old female admitted following left total knee with Dr. Helton.  Plan is home today with [facility] Mon. Feb. 5th, 2024 at 12:20  Confirmed post-op follow up Feb. 15, 2024 at 1:15 pm-Vanessa.

## 2024-02-01 NOTE — CONSULTS
Reason For Consult  BONITA     History Of Present Illness  Phyllis Castro is a 63 y.o. female presents for elective left total knee replacement.  Patient has been having swelling of her knee and giving out at times.  Her pain is worse with sitting and standing.  She has tried physical therapy and joint injections without relief.  She was recommended for surgical joint replacement.  Patient underwent preadmission testing on 2024.  Lab work at that time was notable for hemoglobin A1c is 6.1, and negative MRSA nasal swab.    Patient had successful surgery.  No intraoperative complications were noted.  Patient underwent general anesthesia and spinal nerve block.  Estimated blood loss was 50 mL.  Vital signs in PACU, temperature 36, pulse 93, respiratory 16, blood pressure 1 5598, saturation 99% room air.    Patient was seen examined medical floor.  She had not seen physical therapy yet.  She had no difficulty with dinner.  Patient had home CPAP at bedside.  She had been up out of bed and had voided without difficulty.  Her pain was well-controlled.     Past Medical History  She has a past medical history of Acute vaginitis (2015), Anxiety, Candidiasis, unspecified (2015), CPAP (continuous positive airway pressure) dependence, Delayed emergence from general anesthesia, Depression, Encounter for screening mammogram for malignant neoplasm of breast (01/15/2016), Encounter for screening, unspecified (2015), Personal history of other diseases of the female genital tract, Personal history of other diseases of the female genital tract (2015), Personal history of other endocrine, nutritional and metabolic disease, Plantar fascial fibromatosis (2015), Sleep apnea, Unspecified benign mammary dysplasia of unspecified breast, Unspecified ovarian cyst, left side (2015), and Vertigo.    Surgical History  She has a past surgical history that includes Tonsillectomy (2015);   "section, classic (01/06/2015); Tubal ligation (01/06/2015); Umbilical hernia repair (01/06/2015); Other surgical history (Bilateral, 05/27/2015); Foot surgery (Right, 05/27/2015); Hysterectomy; Oophorectomy; Other surgical history (Left); Rotator cuff repair (Right); Other surgical history (Right); Other surgical history (Bilateral); and Other surgical history (Right).     Social History  She reports that she has never smoked. She has never used smokeless tobacco. She reports current alcohol use of about 1.0 standard drink of alcohol per week. She reports that she does not use drugs.    Family History  No family history on file.     Allergies  Patient has no known allergies.    Review of Systems  10 point organ system reviewed, pertinent positives mentioned HPI, others are negative.     Physical Exam  Gen.: Alert and oriented ×3, no acute distress  Head: Normocephalic atraumatic  Eyes:  Pupils equal reactive to light, extraocular muscle intact  Neck: No cervical lymphadenopathy thyromegaly, trachea midline   Heart: Regular rate and rhythm, no murmurs rubs or gallops  Lungs: Clear to auscultation bilaterally, no wheezes, rales, or rhonchi  Abdomen: Soft nontender positive bowel sounds, no rebound or guarding  Extremities: No peripheral edema cyanosis or clubbing  Skin: Warm and intact  Neuro: Cranial nerves II 2 through 12 grossly intact, no focal deficits  Psych: Insight and judgment intact     Last Recorded Vitals  Blood pressure 132/63, pulse 99, temperature 36.2 °C (97.2 °F), temperature source Temporal, resp. rate 16, height 1.753 m (5' 9\"), weight 119 kg (263 lb 0.1 oz), SpO2 96 %.    Relevant Results  No results found for this or any previous visit (from the past 96 hour(s)).      Assessment/Plan   Status post left total knee replacement  Acute left knee pain  Obstructive sleep apnea on CPAP  DVT Prophylaxis  PT, OT    PLAN  Postop surgical care per orthopedics.  Pain control has been ordered with scheduled " Toradol.  In addition the patient has oxycodone and Dilaudid IV as needed.  Encourage incentive spirometry.  Continue patient's home CPAP.  Monitor postop labs.  Patient has been ordered aspirin for DVT prophylaxis.            Israel Lua, DO

## 2024-02-01 NOTE — NURSING NOTE
"Blood pressure 135/67, pulse 78, temperature 36 °C (96.8 °F), temperature source Temporal, resp. rate 16, height 1.753 m (5' 9\"), weight 119 kg (263 lb 0.1 oz), SpO2 97 %.   Patient got the floor, vital is stable. Alert by 3. Oriented to room. Call light within reach. Continue monitor.    "

## 2024-02-01 NOTE — PROGRESS NOTES
Placed patient's home CPAP onto bedside table without incidence, plugged into electrical outlet, and machine within reach of patient.  Filled water chamber to patient's desired water level with sterile water.        Deion Steen, RRT

## 2024-02-01 NOTE — CARE PLAN
Problem: Pain  Goal: My pain/discomfort is manageable  Outcome: Progressing     Problem: Safety  Goal: Patient will be injury free during hospitalization  Outcome: Progressing  Goal: I will remain free of falls  Outcome: Progressing     Problem: Daily Care  Goal: Daily care needs are met  Outcome: Progressing     Problem: Psychosocial Needs  Goal: Demonstrates ability to cope with hospitalization/illness  Outcome: Progressing  Goal: Collaborate with me, my family, and caregiver to identify my specific goals  Outcome: Progressing     Problem: Discharge Barriers  Goal: My discharge needs are met  Outcome: Progressing     Problem: Pain  Goal: Takes deep breaths with improved pain control throughout the shift  Outcome: Progressing  Goal: Turns in bed with improved pain control throughout the shift  Outcome: Progressing  Goal: Walks with improved pain control throughout the shift  Outcome: Progressing  Goal: Performs ADL's with improved pain control throughout shift  Outcome: Progressing  Goal: Participates in PT with improved pain control throughout the shift  Outcome: Progressing  Goal: Free from opioid side effects throughout the shift  Outcome: Progressing  Goal: Free from acute confusion related to pain meds throughout the shift  Outcome: Progressing     Problem: Pain - Adult  Goal: Verbalizes/displays adequate comfort level or baseline comfort level  Outcome: Progressing     Problem: Safety - Adult  Goal: Free from fall injury  Outcome: Progressing     Problem: Discharge Planning  Goal: Discharge to home or other facility with appropriate resources  Outcome: Progressing     Problem: Chronic Conditions and Co-morbidities  Goal: Patient's chronic conditions and co-morbidity symptoms are monitored and maintained or improved  Outcome: Progressing     Problem: Risk for falls  Goal: I will remain free from falls  Outcome: Progressing

## 2024-02-01 NOTE — PROGRESS NOTES
Medication Education     Medication education for Phyllis Castro was provided to the patient  for the following medication(s):  Percocet  Baclofen    M2B delivered.      Medication education provided by a Pharmacist:  Dose, frequency, storage How to take and what to do if a dose is missed Proper dose, indication, possible ADRs How the medication works and benefits of taking it Benefits of taking the medication     Identified potential barriers to education:  None    Method(s) of Education:  Verbal Written materials provided and reviewed    An opportunity to ask questions and receive answers was provided.     Assessment of understanding the patient :  2= meets goals/outcomes    Additional Notes (if applicable):     Miguelangel Borrego, PharmD

## 2024-02-01 NOTE — NURSING NOTE
Pt. Vital signs obtained.  Pt. Encouraged to order breakfast.  Call light within reach.  Bed alarm on

## 2024-02-01 NOTE — PROGRESS NOTES
Interdisciplinary Rounds were completed at the bedside with Patient.  Staff participating in rounds included: Clinical Nurse Orthopedic Coordinator Transitional Care Coordinator Director of Nursing/Assistant Nurse Manager Hospitalist MD/PA Physical Therapist.  Topics discussed included: Today's Plan of Care Discharge Plan and Accommodations Physical Therapy Medications/Preferred Pharmacy and the Patient was given the opportunity to ask additional questions or bring up any concerns at that time.  During the final discharge discussion and review of instructions, they will have another opportunity to review questions or concerns prior to leaving our care.  Patient was given information on who to call post-discharge should new questions or concerns arise.      The patient's plan includes:    Discharge Date/Disposition:  Home, Today with, Outpatient Therapy Services  Discharge Needs: No Equipment Needs Identified  Medications/Pharmacy: [id number] service utilized for discharge prescriptions through [facility]

## 2024-02-01 NOTE — DISCHARGE SUMMARY
Discharge Diagnosis  Primary osteoarthritis of left knee    Issues Requiring Follow-Up  Outpatient PT, analgesics and DVT prophylaxis    Test Results Pending At Discharge  Pending Labs       No current pending labs.            Hospital Course   Admitted for obs, doing well, stable for discharge home    Pertinent Physical Exam At Time of Discharge  Physical Exam: AAO x 3, dressing c/d/I. Motor and sensory intact, no signs of DVT or infection    Home Medications     Medication List      START taking these medications     aspirin 81 mg EC tablet; Take 1 tablet (81 mg) by mouth 2 times a day   for 14 days.   polyethylene glycol 17 gram packet; Commonly known as: Glycolax,   Miralax; Take 17 g by mouth once daily. Once or twice a day when taking   the Percocet to avoid constipation     CONTINUE taking these medications     baclofen 10 mg tablet; Commonly known as: Lioresal; take 1 tablet by   mouth 3 times a day   oxyCODONE-acetaminophen 5-325 mg tablet; Commonly known as: Percocet;   take 1 tablet by mouth every 4-6 hours as needed   venlafaxine XR 75 mg 24 hr capsule; Commonly known as: Effexor-XR     STOP taking these medications     chlorhexidine 0.12 % solution; Commonly known as: Peridex   ibuprofen 200 mg tablet       Outpatient Follow-Up  Dr Helton 2/15/24 at 1:15 pm [place] office  PT 2/5/24 at 12:20 pm [facility] office    Alcides Helton DO

## 2024-02-21 ENCOUNTER — HOSPITAL ENCOUNTER (OUTPATIENT)
Dept: RADIOLOGY | Facility: HOSPITAL | Age: 64
Discharge: HOME | End: 2024-02-21
Payer: COMMERCIAL

## 2024-02-21 DIAGNOSIS — R06.02 SHORTNESS OF BREATH: ICD-10-CM

## 2024-02-21 PROCEDURE — 2550000001 HC RX 255 CONTRASTS: Performed by: ORTHOPAEDIC SURGERY

## 2024-02-21 PROCEDURE — 71275 CT ANGIOGRAPHY CHEST: CPT

## 2024-02-21 PROCEDURE — 71275 CT ANGIOGRAPHY CHEST: CPT | Mod: FOREIGN READ | Performed by: RADIOLOGY

## 2024-02-21 RX ADMIN — IOHEXOL 90 ML: 350 INJECTION, SOLUTION INTRAVENOUS at 11:55

## 2025-05-06 ENCOUNTER — PREP FOR PROCEDURE (OUTPATIENT)
Dept: ORTHOPEDICS | Facility: HOSPITAL | Age: 65
End: 2025-05-06
Payer: COMMERCIAL

## 2025-05-06 DIAGNOSIS — M75.112 PARTIAL NONTRAUMATIC RUPTURE OF ROTATOR CUFF, LEFT: Primary | ICD-10-CM

## 2025-05-21 ENCOUNTER — CLINICAL SUPPORT (OUTPATIENT)
Dept: PREADMISSION TESTING | Facility: HOSPITAL | Age: 65
End: 2025-05-21
Payer: COMMERCIAL

## 2025-05-21 VITALS — HEIGHT: 69 IN | WEIGHT: 275 LBS | BODY MASS INDEX: 40.73 KG/M2

## 2025-05-21 RX ORDER — IBUPROFEN 200 MG
800 TABLET ORAL 2 TIMES DAILY PRN
COMMUNITY

## 2025-05-21 ASSESSMENT — ENCOUNTER SYMPTOMS
RHINORRHEA: 0
ABDOMINAL PAIN: 0
NAUSEA: 0
EYE DISCHARGE: 0
FEVER: 0
CHILLS: 0
COUGH: 0
VOMITING: 0
DIFFICULTY URINATING: 0
CONFUSION: 0
NERVOUS/ANXIOUS: 0
SHORTNESS OF BREATH: 0
EYE PAIN: 0
SINUS CONGESTION: 0
ARTHRALGIAS: 1
WOUND: 0
AGITATION: 0
DIARRHEA: 0

## 2025-05-21 NOTE — CPM/PAT NURSE NOTE
CPM/PAT Nurse Note      Name: Phyllis Castro (Phyllis ZAEHER Castro)  /Age: 1960/64 y.o.       Medical History[1]    Surgical History[2]    Patient Sexual activity questions deferred to the physician.    Family History[3]    Allergies[4]    Prior to Admission medications    Medication Sig Start Date End Date Taking? Authorizing Provider   venlafaxine XR (Effexor-XR) 75 mg 24 hr capsule Take 1 capsule (75 mg) by mouth once daily. 23 Yes Historical Provider, MD   ibuprofen 200 mg tablet Take 4 tablets (800 mg) by mouth 2 times a day as needed for mild pain (1 - 3).    Historical Provider, MD   baclofen (Lioresal) 10 mg tablet take 1 tablet by mouth 3 times a day 24     oxyCODONE-acetaminophen (Percocet) 5-325 mg tablet take 1 tablet by mouth every 4-6 hours as needed 24     polyethylene glycol (Glycolax, Miralax) 17 gram packet Take 17 g by mouth once daily. Once or twice a day when taking the Percocet to avoid constipation 24  Alcides Helton DO        PAT ROS:   Constitutional:    no fever   no chills  Neuro/Psych:    no agitation   no confusion   not nervous/anxious  Eyes:    no discharge   no pain   use of corrective lenses  Ears:    no ear pain   no ear discharge   tinnitus  Nose:    no nasal discharge   no sinus congestion  Mouth:   Throat:    no throat pain  Neck:   Cardio:    no chest pain  Respiratory:    no cough   no shortness of breath  Endocrine:   GI:    no abdominal pain   no diarrhea   no nausea   no vomiting  :    no difficulty urinating  Musculoskeletal:    arthralgias (left shoulder)  Hematologic:    no history of blood transfusion   no blood clots  Skin:   no sores/wound   no rash      DASI Risk Score    No data to display       Caprini DVT Assessment      Flowsheet Row Admission (Discharged) from 2024 in [facility] 2 with Alcides Helton DO   DVT Score (IF A SCORE IS NOT CALCULATING, MUST SELECT A BMI TO COMPLETE) 10  filed at 01/30/2024 1325   BMI (BMI MUST BE CHOSEN) 31-40 (Obesity) filed at 01/30/2024 1325   RETIRED: Current Status Elective major lower extremity arthroplasty filed at 01/30/2024 1325   RETIRED: Age 60-75 years filed at 01/30/2024 1325          Modified Frailty Index    No data to display       [id number] Stroke Risk Points  Current as of just now        N/A 0 to 9 Points:      Last Change: N/A          The GAS6LO6-OPBp risk score (Lip WM, et al. 2009. © 2010 [employer]) quantifies the risk of stroke for a patient with atrial fibrillation. For patients without atrial fibrillation or under the age of 18 this score appears as N/A. Higher score values generally indicate higher risk of stroke.        This score is not applicable to this patient. Components are not calculated.          Revised Cardiac Risk Index    No data to display       Apfel Simplified Score    No data to display       Risk Analysis Index Results This Encounter    No data found in the last 10 encounters.       Stop Bang Score      Flowsheet Row Clinical Support from 5/21/2025 in [facility] Admission (Discharged) from 1/31/2024 in [facility] 2 with Alcides Helton, DO   Do you snore loudly? 1 filed at 05/21/2025 1314 --  [patient diagnosed sleep spnea, cpap compliant] filed at 01/31/2024 1312   Do you often feel tired or fatigued after your sleep? 1 filed at 05/21/2025 1314 --   Has anyone ever observed you stop breathing in your sleep? 1 filed at 05/21/2025 1314 --   Do you have or are you being treated for high blood pressure? 0 filed at 05/21/2025 1314 --   Recent BMI (Calculated) 38.8 filed at 05/21/2025 1314 --   Is BMI greater than 35 kg/m2? 1=Yes filed at 05/21/2025 1314 --   Age older than 50 years old? 1=Yes filed at 05/21/2025 1314 --   Gender - Male 0=No filed at 05/21/2025 1314 --          Prodigy: High Risk  Total Score: 8              Prodigy Age Score           ARISCAT  Score for Postoperative Pulmonary Complications    No data to display       Jaqueline Perioperative Risk for Myocardial Infarction or Cardiac Arrest (ABBEY)    No data to display         Nurse Plan of Action:                [1]   Past Medical History:  Diagnosis Date    Acute vaginitis 2015    Bacterial vaginosis    Anxiety     Arthritis     Candidiasis, unspecified 2015    Yeast infection    CPAP (continuous positive airway pressure) dependence     Delayed emergence from general anesthesia     Depression     Encounter for screening mammogram for malignant neoplasm of breast 01/15/2016    Visit for screening mammogram    Encounter for screening, unspecified 2015    Screening    Fractures     ankle    Personal history of other diseases of the female genital tract     History of ovarian cyst    Personal history of other diseases of the female genital tract 2015    History of menorrhagia    Personal history of other endocrine, nutritional and metabolic disease     History of dehydration    Plantar fascial fibromatosis 2015    Plantar fasciitis    Sleep apnea     uses cpap    Unspecified benign mammary dysplasia of unspecified breast     Atypical lobular hyperplasia of breast    Unspecified ovarian cyst, left side 2015    Cyst of left ovary    Vertigo    [2]   Past Surgical History:  Procedure Laterality Date     SECTION, CLASSIC  2015     Section x2    FOOT SURGERY Right 2015    Foot Repair    HYSTERECTOMY      with BSO    KNEE ARTHROPLASTY      OOPHORECTOMY      OTHER SURGICAL HISTORY Bilateral 2015    Bx Breast Percutan Needle Core Use Imag Guide (Stereotactic)    OTHER SURGICAL HISTORY Left     triggere finger release    OTHER SURGICAL HISTORY Right     thumb tendon release    OTHER SURGICAL HISTORY Bilateral     breast mass removed    OTHER SURGICAL HISTORY Right     right ankle ORIF w/ HW    ROTATOR CUFF REPAIR Right     TONSILLECTOMY  2015     Tonsillectomy    TUBAL LIGATION  01/06/2015    Tubal Ligation    UMBILICAL HERNIA REPAIR  01/06/2015    Umbilical Hernia Repair   [3]   Family History  Problem Relation Name Age of Onset    COPD Mother      Sleep apnea Mother      Heart disease Mother          CABG 10/2024    Heart disease Father          MI and CABG    Sleep apnea Father      Other (vertigo) Father      Other (lobular hyperplagia) Sister     [4]   Allergies  Allergen Reactions    Adhesive Hives and Other     Skin tore     Skin tore    Acetaminophen GI Upset

## 2025-05-28 ENCOUNTER — APPOINTMENT (OUTPATIENT)
Dept: PREADMISSION TESTING | Facility: HOSPITAL | Age: 65
End: 2025-05-28
Payer: COMMERCIAL

## 2025-06-02 ENCOUNTER — PRE-ADMISSION TESTING (OUTPATIENT)
Dept: PREADMISSION TESTING | Facility: HOSPITAL | Age: 65
End: 2025-06-02
Payer: COMMERCIAL

## 2025-06-02 ENCOUNTER — LAB (OUTPATIENT)
Dept: LAB | Facility: HOSPITAL | Age: 65
End: 2025-06-02
Payer: COMMERCIAL

## 2025-06-02 VITALS
WEIGHT: 292.3 LBS | OXYGEN SATURATION: 96 % | TEMPERATURE: 97.9 F | DIASTOLIC BLOOD PRESSURE: 94 MMHG | HEIGHT: 69 IN | RESPIRATION RATE: 18 BRPM | SYSTOLIC BLOOD PRESSURE: 136 MMHG | HEART RATE: 86 BPM | BODY MASS INDEX: 43.29 KG/M2

## 2025-06-02 DIAGNOSIS — M75.112 PARTIAL NONTRAUMATIC RUPTURE OF ROTATOR CUFF, LEFT: ICD-10-CM

## 2025-06-02 DIAGNOSIS — Z01.818 PRE-OP EVALUATION: Primary | ICD-10-CM

## 2025-06-02 DIAGNOSIS — Z01.818 ENCOUNTER FOR OTHER PREPROCEDURAL EXAMINATION: Primary | ICD-10-CM

## 2025-06-02 DIAGNOSIS — Z01.818 PREOP TESTING: ICD-10-CM

## 2025-06-02 LAB
ANION GAP SERPL CALC-SCNC: 10 MMOL/L (ref 10–20)
BASOPHILS # BLD AUTO: 0.03 X10*3/UL (ref 0–0.1)
BASOPHILS NFR BLD AUTO: 0.4 %
BUN SERPL-MCNC: 9 MG/DL (ref 6–23)
CALCIUM SERPL-MCNC: 9.2 MG/DL (ref 8.6–10.3)
CHLORIDE SERPL-SCNC: 103 MMOL/L (ref 98–107)
CO2 SERPL-SCNC: 29 MMOL/L (ref 21–32)
CREAT SERPL-MCNC: 0.48 MG/DL (ref 0.5–1.05)
EGFRCR SERPLBLD CKD-EPI 2021: >90 ML/MIN/1.73M*2
EOSINOPHIL # BLD AUTO: 0.08 X10*3/UL (ref 0–0.7)
EOSINOPHIL NFR BLD AUTO: 1 %
ERYTHROCYTE [DISTWIDTH] IN BLOOD BY AUTOMATED COUNT: 12.7 % (ref 11.5–14.5)
GLUCOSE SERPL-MCNC: 106 MG/DL (ref 74–99)
HCT VFR BLD AUTO: 44.3 % (ref 36–46)
HGB BLD-MCNC: 14.5 G/DL (ref 12–16)
IMM GRANULOCYTES # BLD AUTO: 0.02 X10*3/UL (ref 0–0.7)
IMM GRANULOCYTES NFR BLD AUTO: 0.3 % (ref 0–0.9)
LYMPHOCYTES # BLD AUTO: 1.76 X10*3/UL (ref 1.2–4.8)
LYMPHOCYTES NFR BLD AUTO: 22.9 %
MCH RBC QN AUTO: 29 PG (ref 26–34)
MCHC RBC AUTO-ENTMCNC: 32.7 G/DL (ref 32–36)
MCV RBC AUTO: 89 FL (ref 80–100)
MONOCYTES # BLD AUTO: 0.61 X10*3/UL (ref 0.1–1)
MONOCYTES NFR BLD AUTO: 7.9 %
NEUTROPHILS # BLD AUTO: 5.18 X10*3/UL (ref 1.2–7.7)
NEUTROPHILS NFR BLD AUTO: 67.5 %
NRBC BLD-RTO: NORMAL /100{WBCS}
PLATELET # BLD AUTO: 240 X10*3/UL (ref 150–450)
POTASSIUM SERPL-SCNC: 4.4 MMOL/L (ref 3.5–5.3)
RBC # BLD AUTO: 5 X10*6/UL (ref 4–5.2)
SODIUM SERPL-SCNC: 138 MMOL/L (ref 136–145)
WBC # BLD AUTO: 7.7 X10*3/UL (ref 4.4–11.3)

## 2025-06-02 PROCEDURE — 93005 ELECTROCARDIOGRAM TRACING: CPT

## 2025-06-02 PROCEDURE — 99204 OFFICE O/P NEW MOD 45 MIN: CPT | Performed by: NURSE PRACTITIONER

## 2025-06-02 PROCEDURE — 85025 COMPLETE CBC W/AUTO DIFF WBC: CPT

## 2025-06-02 PROCEDURE — 80048 BASIC METABOLIC PNL TOTAL CA: CPT

## 2025-06-02 PROCEDURE — 36415 COLL VENOUS BLD VENIPUNCTURE: CPT

## 2025-06-02 ASSESSMENT — ENCOUNTER SYMPTOMS
NECK NEGATIVE: 1
CONSTITUTIONAL NEGATIVE: 1
ENDOCRINE NEGATIVE: 1
ARTHRALGIAS: 1
EYES NEGATIVE: 1
GASTROINTESTINAL NEGATIVE: 1
NEUROLOGICAL NEGATIVE: 1
RESPIRATORY NEGATIVE: 1
CARDIOVASCULAR NEGATIVE: 1

## 2025-06-02 ASSESSMENT — LIFESTYLE VARIABLES: SMOKING_STATUS: NONSMOKER

## 2025-06-02 ASSESSMENT — PAIN - FUNCTIONAL ASSESSMENT: PAIN_FUNCTIONAL_ASSESSMENT: 0-10

## 2025-06-02 ASSESSMENT — DUKE ACTIVITY SCORE INDEX (DASI)
CAN YOU HAVE SEXUAL RELATIONS: YES
CAN YOU WALK A BLOCK OR TWO ON LEVEL GROUND: YES
CAN YOU PARTICIPATE IN MODERATE RECREATIONAL ACTIVITIES LIKE GOLF, BOWLING, DANCING, DOUBLES TENNIS OR THROWING A BASEBALL OR FOOTBALL: NO
CAN YOU DO MODERATE WORK AROUND THE HOUSE LIKE VACUUMING, SWEEPING FLOORS OR CARRYING GROCERIES: YES
CAN YOU CLIMB A FLIGHT OF STAIRS OR WALK UP A HILL: YES
CAN YOU TAKE CARE OF YOURSELF (EAT, DRESS, BATHE, OR USE TOILET): YES
CAN YOU DO LIGHT WORK AROUND THE HOUSE LIKE DUSTING OR WASHING DISHES: YES
CAN YOU RUN A SHORT DISTANCE: YES
CAN YOU DO YARD WORK LIKE RAKING LEAVES, WEEDING OR PUSHING A MOWER: YES
CAN YOU WALK INDOORS, SUCH AS AROUND YOUR HOUSE: YES
CAN YOU DO HEAVY WORK AROUND THE HOUSE LIKE SCRUBBING FLOORS OR LIFTING AND MOVING HEAVY FURNITURE: YES
DASI METS SCORE: 8.2
CAN YOU PARTICIPATE IN STRENOUS SPORTS LIKE SWIMMING, SINGLES TENNIS, FOOTBALL, BASKETBALL, OR SKIING: NO
TOTAL_SCORE: 44.7

## 2025-06-02 ASSESSMENT — PAIN SCALES - GENERAL: PAINLEVEL_OUTOF10: 7

## 2025-06-02 ASSESSMENT — PAIN DESCRIPTION - DESCRIPTORS: DESCRIPTORS: ACHING

## 2025-06-02 NOTE — PREPROCEDURE INSTRUCTIONS
Medication List            Accurate as of June 2, 2025 10:29 AM. Always use your most recent med list.                ibuprofen 200 mg tablet  Additional Medication Adjustments for Surgery: Take last dose 7 days before surgery     venlafaxine XR 75 mg 24 hr capsule  Commonly known as: Effexor-XR  Medication Adjustments for Surgery: Take/Use as prescribed          If no issues with your liver you may take Tylenol 2-500 mg tablets every 8 hrs around the clock for pain including morning of surgery with a sip of water    STOP VITAMINS, SUPPLEMENTS, HERBS, PROBIOTICS, AND FISH OIL, NO MOTRIN OR ADVIL OR ALEVE 7 DAYS BEFORE SURGERY    IF YOU HAVE A CPAP MACHINE BRING ON DAY OF SURGERY    Additional Instructions:  if not a joint replacement, body wash and mouth wash do not pertain to you     Seven/Six Days before Surgery:  Review your medication instructions, stop indicated medications  Five Days before Surgery:  Review your medication instructions, stop indicated medications  Three Days before Surgery:  Review your medication instructions, stop indicated medications  The Day before Surgery:  No smoking or alcohol use 24 hours before surgery  Review your medication instructions, stop indicated medications  You will be contacted regarding the time of your arrival to facility and surgery time  Do not eat any food after Midnight  Day of Surgery:  Review your medication instructions, take indicated medications  If you have diabetes, please check your fasting blood sugar upon awakening.  If fasting blood sugar is <80 mg/dl, drink 100 ml of apple juice, time limit of 2 hours before  You may have clear liquids until TWO hours before surgery/procedure.  This includes water, black tea/coffee, (no milk or cream) apple juice and electrolyte drinks (Gatorade)  You may chew gum up to TWO hours before your surgery/procedure  Wear  comfortable loose fitting clothing  Do not use moisturizers, creams, lotions or perfume  All jewelry and  valuables should be left at home     CONTACT SURGEON'S OFFICE IF YOU DEVELOP:  * Fever = 100.4 F   * New respiratory symptoms (e.g. cough, shortness of breath, respiratory distress, sore throat)  * Recent loss of taste or smell  *Flu like symptoms such as headache, fatigue or gastrointestinal symptoms  * You develop any open sores, shingles, burning or painful urination   AND/OR:  * You no longer wish to have the surgery.  * Any other personal circumstances change that may lead to the need to cancel or defer this surgery.  *You were admitted to any hospital within one week of your planned procedure.     SMOKING:  *Quitting smoking can make a huge difference to your health and recovery from surgery.    *If you need help with quitting, call 5-800QUIT-NOW.     Preoperative Fasting Guidelines     Why must I stop eating and drinking near surgery time?  With sedation, food or liquid in your stomach can enter your lungs causing serious complications  Increases nausea and vomiting     When do I need to stop eating and drinking before my surgery?  Do not eat any food after midnight the night before your surgery/procedure.  You may have up to TEN ounces of clear liquid until TWO hours before your instructed arrival time to the hospital.  This includes water, black tea/coffee, (no milk or cream) apple juice, and electrolyte drinks (Gatorade)  You may chew gum until TWO hours before your surgery/procedure     SURGICAL TIME:  *You will be contacted between 11 am and 3 pm  the business day before your surgery with your arrival time.  *If you haven't received a call by 3pm, call your surgeons office, do not rely on MyChart time as this is not confirmed until the day before surgery.  *Scheduled surgery times may change and you will be notified if this occurs-check your personal voicemail for any updates.     ON THE MORNING OF SURGERY:  *Wear comfortable, loose fitting clothing.   *Do not use moisturizers, creams, lotions or  perfume.  *All jewelry and valuables should be left at home.  *Prosthetic devices such as contact lenses, hearing aids, dentures, eyelash extensions, hairpins and body piercing must be removed before surgery.     BRING WITH YOU:  *Photo ID and insurance card  *Current list of medications and allergies  *Pacemaker/Defibrillator/Heart stent cards  *CPAP machine and mask  *Slings/splints/crutches  *Copy of your complete Advanced Directive/DHPOA-if applicable  *Neurostimulator implant remote     PARKING AND ARRIVAL:  *Check in at the Main Entrance desk and let them know you are here for surgery.     IF YOU ARE HAVING OUTPATIENT/SAME DAY SURGERY:  *A responsible adult MUST accompany you at the time of discharge and stay with you for 24 hours after your surgery.  *You may NOT drive yourself home after surgery.  *You may use a taxi or ride sharing service ([employer], [employer]) to return home ONLY if you are accompanied by a friend or family member.  *Instructions for resuming your medications will be provided by your surgeon.     Thank you for coming to Pre Admission testing.      If I have prescribe medication please don't forget to  at your pharmacy.      Any questions about today's visit call [phone] and leave a message in the general mailbox.     Patient instructed to ambulate as soon as possible postoperatively to decrease thromboembolic risk.     Jef Mathis, PAULINO-CNP     Thank you for visiting [facility].  If you have any changes to your health condition, please call the surgeons office to alert them and give them details of your symptoms.     Additional Instructions:      The Day before Surgery:  -Review your medication instructions, stop indicated medications  -You will be contacted in the evening regarding the time of your arrival to facility and surgery time     Day of Surgery:  -Review your medication instructions, take indicated medications  -Wear comfortable loose fitting  clothing  -Do not use moisturizers, creams, lotions or perfume  -All jewelry and valuables should be left at home              Preoperative Brain Exercises     What are brain exercises?  A brain exercise is any activity that engages your thinking (cognitive) skills.     What types of activities are considered brain exercises?  Jigsaw puzzles, crossword puzzles, word jumble, memory games, word search, and many more.  Many can be found free online or on your phone via a mobile monique.     Why should I do brain exercises before my surgery?  More recent research has shown brain exercise before surgery can lower the risk of postoperative delirium (confusion) which can be especially important for older adults.  Patients who did brain exercises for 5 to 10 minutes a day the days before surgery, cut their risk of postoperative delirium in half up to 1 week after surgery.                 [facility]     Preoperative Deep Breathing Exercises     Why it is important to do deep breathing exercises before my surgery?  Deep breathing exercises strengthen your breathing muscles.  This helps you to recover after your surgery and decreases the chance of breathing complications.      How are the deep breathing exercises done?  Sit straight with your back supported.  Breathe in deeply and slowly through your nose. Your lower rib cage should expand and your abdomen may move forward.  Hold that breath for 3 to 5 seconds.  Breathe out through pursed lips, slowly and completely.  Rest and repeat 10 times every hour while awake.  Rest longer if you become dizzy or lightheaded.                      [facility]  Preoperative Deep Breathing Exercises     Why it is important to do deep breathing exercises before my surgery?  Deep breathing exercises strengthen your breathing muscles.  This helps you to recover after your surgery and decreases the chance of breathing complications.        How are  the deep breathing exercises done?  Sit straight with your back supported.  Breathe in deeply and slowly through your nose. Your lower rib cage should expand and your abdomen may move forward.  Hold that breath for 3 to 5 seconds.  Breathe out through pursed lips, slowly and completely.  Rest and repeat 10 times every hour while awake.  Rest longer if you become dizzy or lightheaded.        Patient Information: Incentive Spirometer  What is an incentive spirometer?  An incentive spirometer is a device used before and after surgery to “exercise” your lungs.  It helps you to take deeper breaths to expand your lungs.  Below is an example of a basic incentive spirometer.  The device you receive may differ slightly but they all function the same.    Why do I need to use an incentive spirometer?  Using your incentive spirometer prepares your lungs for surgery and helps prevent lung problems after surgery.  How do I use my incentive spirometer?  When you're using your incentive spirometer, make sure to breathe through your mouth. If you breathe through your nose, the incentive spirometer won't work properly. You can hold your nose if you have trouble.  If you feel dizzy at any time, stop and rest. Try again at a later time.  Follow the steps below:  Set up your incentive spirometer, expand the flexible tubing and connect to the outlet.  Sit upright in a chair or bed. Hold the incentive spirometer at eye level.   Put the mouthpiece in your mouth and close your lips tightly around it. Slowly breathe out (exhale) completely.  Breathe in (inhale) slowly through your mouth as deeply as you can. As you take a breath, you will see the piston rise inside the large column. While the piston rises, the indicator should move upwards. It should stay in between the 2 arrows (see Figure).  Try to get the piston as high as you can, while keeping the indicator between the arrows.   If the indicator doesn't stay between the arrows, you're  breathing either too fast or too slow.  When you get it as high as you can, hold your breath for 10 seconds, or as long as possible. While you're holding your breath, the piston will slowly fall to the base of the spirometer.  Once the piston reaches the bottom of the spirometer, breathe out slowly through your mouth. Rest for a few seconds.  Repeat 10 times. Try to get the piston to the same level with each breath.  Repeat every hour while awake  You can carefully clean the outside of the mouthpiece with an alcohol wipe or soap and water.       Patient and Family Education        Ways You Can Help Prevent Blood Clots               This handout explains some simple things you can do to help prevent blood clots.      Blood clots are blockages that can form in the body's veins. When a blood clot forms in your deep veins, it may be called a deep vein thrombosis, or DVT for short. Blood clots can happen in any part of the body where blood flows, but they are most common in the arms and legs. If a piece of a blood clot breaks free and travels to the lungs, it is called a pulmonary embolus (PE). A PE can be a very serious problem.         Being in the hospital or having surgery can raise your chances of getting a blood clot because you may not be well enough to move around as much as you normally do.         Ways you can help prevent blood clots in the hospital           Wearing SCDs. SCDs stands for Sequential Compression Devices.   SCDs are special sleeves that wrap around your legs  They attach to a pump that fills them with air to gently squeeze your legs every few minutes.   This helps return the blood in your legs to your heart.   SCDs should only be taken off when walking or bathing.   SCDs may not be comfortable, but they can help save your life.                                            Wearing compression stockings - if your doctor orders them. These special snug fitting stockings gently squeeze your legs to  help blood flow.       Walking. Walking helps move the blood in your legs.   If your doctor says it is ok, try walking the halls at least   5 times a day. Ask us to help you get up, so you don't fall.      Taking any blood thinning medicines your doctor orders.        Page 1 of 2            [facility]; 3/23   Ways you can help prevent blood clots at home         Wearing compression stockings - if your doctor orders them. ? Walking - to help move the blood in your legs.       Taking any blood thinning medicines your doctor orders.      Signs of a blood clot or PE        Tell your doctor or nurse know right away if you have of the problems listed below.    If you are at home, seek medical care right away. Call 911 for chest pain or problems breathing.                Signs of a blood clot (DVT) - such as pain,  swelling, redness or warmth in your arm or leg      Signs of a pulmonary embolism (PE) - such as chest pain or feeling short of breath    Preoperative Clearances  -If you are informed by the preadmission testing team that you need clearance for your surgery, please reach out to the provider in question to assisting accommodating obtaining your clearance.   -Please have you provider fax your clearance letter to [phone] if needed.   -A blank clearance letter will be provided to you if indicated.     Anticoagulation  -If you are on oral anticoagulation such as Coumadin, Eliquis, Xarelto, Plavix, Brilinta, Pradaxa; we will need to obtain preoperative anticoagulation instructions from the prescribing provider.   -We will reach out to the prescriber but do encourage you to call their office as well as it will increase the chances of getting the necessary information.   -We will contact you with the instruction once we obtain them.       PAULINO George-CNP    Thank you for visiting [facility].  If you have any changes to your health condition, please call the surgeons  office to alert them and give them details of your symptoms.

## 2025-06-02 NOTE — CPM/PAT H&P
"CPM/PAT Evaluation       Name: Phyllis Castro (Phyllsi Castro)  /Age: 1960/64 y.o.     Visit Type:   In-Person       Chief Complaint: nontraumatic incomplete tear of L rotator cuff    HPI 63 yo female referred to PAT by Dr Marcum for evaluation in advance of her L shoulder debridement and arthroscopy with rotator cuff repair on 2025. She has failed conservative therapy. States her injury is related to over use    Medical History[1]    Surgical History[2]    Patient Sexual activity questions deferred to the physician.    Family History[3]    Allergies[4]    Medication Documentation Review Audit       Reviewed by Viki Vicente, RN (Registered Nurse) on 25 at 1002      Medication Order Taking? Sig Documenting Provider Last Dose Status   ibuprofen 200 mg tablet [id number] Yes Take 4 tablets (800 mg) by mouth 2 times a day as needed for mild pain (1 - 3). Historical Provider, MD More than a month Active   venlafaxine XR (Effexor-XR) 75 mg 24 hr capsule [id number] Yes Take 1 capsule (75 mg) by mouth once daily. Historical Provider, MD 2025 Active                    PAT ROS:   Constitutional:   neg    Neuro/Psych:   neg    Eyes:   neg    Ears:   neg    Nose:   neg    Mouth:   neg    Throat:   neg    Neck:   neg    Cardio:   neg    Respiratory:   neg    Endocrine:   neg    GI:   neg    :   neg    Musculoskeletal:    See HPI   arthralgias  Hematologic:   neg    Skin:  neg        Physical Exam  Vitals and nursing note reviewed. Physical exam within normal limits.          PAT AIRWAY:   Airway:     Mallampati::  II    TM distance::  >3 FB    Neck ROM::  Full      Visit Vitals  BP (!) 136/94   Pulse 86   Temp 36.6 °C (97.9 °F) (Temporal)   Resp 18   Ht 1.753 m (5' 9\")   Wt 133 kg (292 lb 4.8 oz)   SpO2 96%   BMI 43.17 kg/m²   Smoking Status Never   BSA 2.54 m²       DASI Risk Score      Flowsheet Row Pre-Admission Testing from 2025 in [facility]   Can you take care of yourself " (eat, dress, bathe, or use toilet)?  2.75 filed at 06/02/2025 1058   Can you walk indoors, such as around your house? 1.75 filed at 06/02/2025 1058   Can you walk a block or two on level ground?  2.75 filed at 06/02/2025 1058   Can you climb a flight of stairs or walk up a hill? 5.5 filed at 06/02/2025 1058   Can you run a short distance? 8 filed at 06/02/2025 1058   Can you do light work around the house like dusting or washing dishes? 2.7 filed at 06/02/2025 1058   Can you do moderate work around the house like vacuuming, sweeping floors or carrying groceries? 3.5 filed at 06/02/2025 1058   Can you do heavy work around the house like scrubbing floors or lifting and moving heavy furniture?  8 filed at 06/02/2025 1058   Can you do yard work like raking leaves, weeding or pushing a mower? 4.5 filed at 06/02/2025 1058   Can you have sexual relations? 5.25 filed at 06/02/2025 1058   Can you participate in moderate recreational activities like golf, bowling, dancing, doubles tennis or throwing a baseball or football? 0 filed at 06/02/2025 1058   Can you participate in strenous sports like swimming, singles tennis, football, basketball, or skiing? 0 filed at 06/02/2025 1058   DASI SCORE 44.7 filed at 06/02/2025 1058   METS Score (Will be calculated only when all the questions are answered) 8.2 filed at 06/02/2025 1058          [place] DVT Assessment      Flowsheet Row Pre-Admission Testing from 6/2/2025 in [facility] Admission (Discharged) from 1/31/2024 in [facility] 2 with Alcides Helton, DO   DVT Score (IF A SCORE IS NOT CALCULATING, MUST SELECT A BMI TO COMPLETE) 8 filed at 06/02/2025 1054 10 filed at 01/30/2024 1325   Surgical Factors Major surgery planned, lasting 2-3 hours filed at 06/02/2025 1054 --   BMI (BMI MUST BE CHOSEN) 41-50 (Morbid obesity) filed at 06/02/2025 1052 31-40 (Obesity) filed at 01/30/2024 1325   RETIRED: Current Status -- Elective major lower extremity  arthroplasty filed at 01/30/2024 1325   RETIRED: Age -- 60-75 years filed at 01/30/2024 1325          Modified Frailty Index    No data to display       [id number] Stroke Risk Points  Current as of 21 minutes ago        N/A 0 to 9 Points:      Last Change: N/A          The [id number] risk score (Joana BURK, et al. 2009. © 2010 [employer]) quantifies the risk of stroke for a patient with atrial fibrillation. For patients without atrial fibrillation or under the age of 18 this score appears as N/A. Higher score values generally indicate higher risk of stroke.        This score is not applicable to this patient. Components are not calculated.          Revised Cardiac Risk Index      Flowsheet Row Pre-Admission Testing from 6/2/2025 in [facility]   High-Risk Surgery (Intraperitoneal, Intrathoracic,Suprainguinal vascular) 0 filed at 06/02/2025 1103   History of ischemic heart disease (History of MI, History of positive exercuse test, Current chest paint considered due to myocardial ischemia, Use of nitrate therapy, ECG with pathological Q Waves) 0 filed at 06/02/2025 1103   History of congestive heart failure (pulmonary edemia, bilateral rales or S3 gallop, Paroxysmal nocturnal dyspnea, CXR showing pulmonary vascular redistribution) 0 filed at 06/02/2025 1103   History of cerebrovascular disease (Prior TIA or stroke) 0 filed at 06/02/2025 1103   Pre-operative insulin treatment 0 filed at 06/02/2025 1103   Pre-operative creatinine>2 mg/dl 0 filed at 06/02/2025 1103   Revised Cardiac Risk Calculator 0 filed at 06/02/2025 1103          Apfel Simplified Score      Flowsheet Row Pre-Admission Testing from 6/2/2025 in [facility]   Smoking status 1 filed at 06/02/2025 1103   History of motion sickness or PONV  1 filed at 06/02/2025 1103   Use of postoperative opioids 0 filed at 06/02/2025 1103   Gender - Female 1=Yes filed at 06/02/2025 1103   Apfel Simplified Score  Calculator 3 filed at 06/02/2025 1103          Risk Analysis Index Results This Encounter    No data found in the last 10 encounters.       Stop Bang Score      Flowsheet Row Pre-Admission Testing from 6/2/2025 in [facility] Clinical Support from 5/21/2025 in [facility]   Do you snore loudly? 1 filed at 06/02/2025 1008 1 filed at 05/21/2025 1314   Do you often feel tired or fatigued after your sleep? 1 filed at 06/02/2025 1008 1 filed at 05/21/2025 1314   Has anyone ever observed you stop breathing in your sleep? 1 filed at 06/02/2025 1008 1 filed at 05/21/2025 1314   Do you have or are you being treated for high blood pressure? 0 filed at 06/02/2025 1008 0 filed at 05/21/2025 1314   Recent BMI (Calculated) 43.2 filed at 06/02/2025 1008 38.8 filed at 05/21/2025 1314   Is BMI greater than 35 kg/m2? 1=Yes filed at 06/02/2025 1008 1=Yes filed at 05/21/2025 1314   Age older than 50 years old? 1=Yes filed at 06/02/2025 1008 1=Yes filed at 05/21/2025 1314   Is your neck circumference greater than 17 inches (Male) or 16 inches (Female)? 1 filed at 06/02/2025 1008 --   Gender - Male 0=No filed at 06/02/2025 1008 0=No filed at 05/21/2025 1314   STOP-BANG Total Score 6 filed at 06/02/2025 1008 --          Prodigy: High Risk  Total Score: 8              Prodigy Age Score           ARISCAT Score for Postoperative Pulmonary Complications      Flowsheet Row Pre-Admission Testing from 6/2/2025 in [facility]   Age Calculated Score 3 filed at 06/02/2025 1103   Preoperative SpO2 0 filed at 06/02/2025 1103   Respiratory infection in the last month Either upper or lower (i.e., URI, bronchitis, pneumonia), with fever and antibiotic treatment 0 filed at 06/02/2025 1103   Preoperative anemia (Hgb less than 10 g/dl) 0 filed at 06/02/2025 1103   Surgical incision  0 filed at 06/02/2025 1103   Duration of surgery  16 filed at 06/02/2025 1103   Emergency Procedure  0 filed at 06/02/2025 1103    ARISCAT Total Score  19 filed at 06/02/2025 1103          [place] Perioperative Risk for Myocardial Infarction or Cardiac Arrest (ABBEY)      Flowsheet Row Pre-Admission Testing from 6/2/2025 in [facility]   Calculated Age Score 1.24 filed at 06/02/2025 1104   Functional Status  0 filed at 06/02/2025 1104   ASA Class  -1.92 filed at 06/02/2025 1104   Creatinine 0 filed at 06/02/2025 1104   Type of Procedure  0.80 filed at 06/02/2025 1104   ABBEY Total Score  -5.13 filed at 06/02/2025 1104   ABBEY % 0.59 filed at 06/02/2025 1104          Assessment and Plan   63 yo female presents to [facility] for risk assessment and preoperative optimization in advance of her shoulder procedure.    Today Her BP is elevated, she is afebrile, pulse ox 96% on room air at rest. I have advised her to home monitor and call PCP if remains elevated. Asked her to document her BP daily and bring on the day of surgery, also to call us if started on new medication    Neuro:  No diagnosis or significant findings on chart review or clinical presentation and evaluation.     HEENT/Airway:  No diagnosis or significant findings on chart review or clinical presentation and evaluation.     Cardiovascular:  Denies chest pain, shortness of breathe, dizziness, palpations, or lightheadedness    METS are  8.2, RCRI is 0 (3.9% risk for 30 day postoperative MACE)  ABBEY indicates a 0.59% risk of intraoperative or 30 day postoperative ABBEY  No additional preoperative testing is currently indicated.    EKG - 6/2/2025 NSR, minimal voltage criteria for LVH, may be a normal variant (R in aVL)    Pulmonary:  BONITA she is not compliant with cpap, discussed importance of use and advised to bring on day of surgery    PRODIGY: Low risk for opioid induced respiratory depression  Discussed smoking cessation and deep breathing handout given    Renal:   No diagnosis or significant findings on chart review, or clinical presentation and evaluation.     Pt at Low risk  for perioperative BIPIN based on Dynamic Predictive Scoring Tool for Perioperative BIPIN  Lab Results   Component Value Date    GLUCOSE 152 (H) 02/01/2024    CALCIUM 8.7 02/01/2024     02/01/2024    K 4.3 02/01/2024    CO2 23 02/01/2024     02/01/2024    BUN 10 02/01/2024    CREATININE 0.48 (L) 02/01/2024       Endocrine:  No diagnosis or significant findings on chart review or clinical presentation and evaluation.     Lab Results   Component Value Date    HGBA1C 6.1 (H) 01/18/2024       Hematologic:  No diagnosis or significant findings on chart review or clinical presentation and evaluation.  Lab Results   Component Value Date    WBC 17.8 (H) 02/01/2024    HGB 13.2 02/01/2024    HCT 39.0 02/01/2024    MCV 86 02/01/2024     02/01/2024       CAPRINI SCORE 8  Pt supplied education/VTE handout    Gastrointestinal:   No diagnosis or significant findings on chart review or clinical presentation and evaluation.   EAT-10 score of 0 - self-perceived oropharyngeal dysphagia scale (0-40)      :  No diagnosis or significant findings on chart review or clinical presentation and evaluation.     Infectious disease:   No diagnosis or significant findings on chart review or clinical presentation and evaluation.     Musculoskeletal:   See HPI  Last dose of NSAIDS  7 days preop  Instructed if  no issues with your liver you may take Tylenol 2-500 mg tablets every 8 hrs around the clock for pain including morning of surgery with a sip of water    Preoperative risk assessment  ASA II  NSQIP - Predicted length of stay days -0-1  PAT Testing - bmp, cbc, A1c, EKG    Anesthesia:  She has delayed emergence    denies dental issues  Smoker-denies  Drugs-denies  ETOH-rare  denies personal/family issues with Anesthesia    Face to Face patient contact time 45 min    THIEN George 6/2/2025 11:12 AM               [1]   Past Medical History:  Diagnosis Date    Acute vaginitis 02/04/2015    Bacterial vaginosis    Anxiety      Arthritis     Candidiasis, unspecified 2015    Yeast infection    CPAP (continuous positive airway pressure) dependence     Delayed emergence from general anesthesia     Depression     Encounter for screening mammogram for malignant neoplasm of breast 01/15/2016    Visit for screening mammogram    Encounter for screening, unspecified 2015    Screening    Fractures     ankle    Joint pain     Obesity     Personal history of other diseases of the female genital tract     History of ovarian cyst    Personal history of other diseases of the female genital tract 2015    History of menorrhagia    Personal history of other endocrine, nutritional and metabolic disease     History of dehydration    Plantar fascial fibromatosis 2015    Plantar fasciitis    Sleep apnea     uses cpap    Unspecified benign mammary dysplasia of unspecified breast     Atypical lobular hyperplasia of breast    Unspecified ovarian cyst, left side 2015    Cyst of left ovary    Vertigo    [2]   Past Surgical History:  Procedure Laterality Date    BREAST BIOPSY      BREAST LUMPECTOMY       SECTION, CLASSIC  2015     Section x2    COLONOSCOPY      FOOT SURGERY Right 2015    Foot Repair    HYSTERECTOMY      with BSO    JOINT REPLACEMENT      KNEE ARTHROPLASTY      OOPHORECTOMY      OTHER SURGICAL HISTORY Bilateral 2015    Bx Breast Percutan Needle Core Use Imag Guide (Stereotactic)    OTHER SURGICAL HISTORY Left     triggere finger release    OTHER SURGICAL HISTORY Right     thumb tendon release    OTHER SURGICAL HISTORY Bilateral     breast mass removed    OTHER SURGICAL HISTORY Right     right ankle ORIF w/ HW    ROTATOR CUFF REPAIR Right     TONSILLECTOMY  2015    Tonsillectomy    TUBAL LIGATION  2015    Tubal Ligation    UMBILICAL HERNIA REPAIR  2015    Umbilical Hernia Repair    UPPER GASTROINTESTINAL ENDOSCOPY      VEIN SURGERY     [3]   Family History  Problem  Relation Name Age of Onset    COPD Mother      Sleep apnea Mother      Heart disease Mother          CABG 10/2024    Heart disease Father          MI and CABG    Sleep apnea Father      Other (vertigo) Father      Other (lobular hyperplagia) Sister     [4]   Allergies  Allergen Reactions    Adhesive Hives and Other     Skin tore     Skin tore    Acetaminophen GI Upset

## 2025-06-02 NOTE — H&P (VIEW-ONLY)
"CPM/PAT Evaluation       Name: Phyllis Castro (Phyllis Castro)  /Age: 1960/64 y.o.     Visit Type:   In-Person       Chief Complaint: nontraumatic incomplete tear of L rotator cuff    HPI 63 yo female referred to PAT by Dr Marcum for evaluation in advance of her L shoulder debridement and arthroscopy with rotator cuff repair on 2025. She has failed conservative therapy. States her injury is related to over use    Medical History[1]    Surgical History[2]    Patient Sexual activity questions deferred to the physician.    Family History[3]    Allergies[4]    Medication Documentation Review Audit       Reviewed by Viki Vicente, RN (Registered Nurse) on 25 at 1002      Medication Order Taking? Sig Documenting Provider Last Dose Status   ibuprofen 200 mg tablet [id number] Yes Take 4 tablets (800 mg) by mouth 2 times a day as needed for mild pain (1 - 3). Historical Provider, MD More than a month Active   venlafaxine XR (Effexor-XR) 75 mg 24 hr capsule [id number] Yes Take 1 capsule (75 mg) by mouth once daily. Historical Provider, MD 2025 Active                    PAT ROS:   Constitutional:   neg    Neuro/Psych:   neg    Eyes:   neg    Ears:   neg    Nose:   neg    Mouth:   neg    Throat:   neg    Neck:   neg    Cardio:   neg    Respiratory:   neg    Endocrine:   neg    GI:   neg    :   neg    Musculoskeletal:    See HPI   arthralgias  Hematologic:   neg    Skin:  neg        Physical Exam  Vitals and nursing note reviewed. Physical exam within normal limits.          PAT AIRWAY:   Airway:     Mallampati::  II    TM distance::  >3 FB    Neck ROM::  Full      Visit Vitals  BP (!) 136/94   Pulse 86   Temp 36.6 °C (97.9 °F) (Temporal)   Resp 18   Ht 1.753 m (5' 9\")   Wt 133 kg (292 lb 4.8 oz)   SpO2 96%   BMI 43.17 kg/m²   Smoking Status Never   BSA 2.54 m²       DASI Risk Score      Flowsheet Row Pre-Admission Testing from 2025 in [facility]   Can you take care of yourself " (eat, dress, bathe, or use toilet)?  2.75 filed at 06/02/2025 1058   Can you walk indoors, such as around your house? 1.75 filed at 06/02/2025 1058   Can you walk a block or two on level ground?  2.75 filed at 06/02/2025 1058   Can you climb a flight of stairs or walk up a hill? 5.5 filed at 06/02/2025 1058   Can you run a short distance? 8 filed at 06/02/2025 1058   Can you do light work around the house like dusting or washing dishes? 2.7 filed at 06/02/2025 1058   Can you do moderate work around the house like vacuuming, sweeping floors or carrying groceries? 3.5 filed at 06/02/2025 1058   Can you do heavy work around the house like scrubbing floors or lifting and moving heavy furniture?  8 filed at 06/02/2025 1058   Can you do yard work like raking leaves, weeding or pushing a mower? 4.5 filed at 06/02/2025 1058   Can you have sexual relations? 5.25 filed at 06/02/2025 1058   Can you participate in moderate recreational activities like golf, bowling, dancing, doubles tennis or throwing a baseball or football? 0 filed at 06/02/2025 1058   Can you participate in strenous sports like swimming, singles tennis, football, basketball, or skiing? 0 filed at 06/02/2025 1058   DASI SCORE 44.7 filed at 06/02/2025 1058   METS Score (Will be calculated only when all the questions are answered) 8.2 filed at 06/02/2025 1058          [place] DVT Assessment      Flowsheet Row Pre-Admission Testing from 6/2/2025 in [facility] Admission (Discharged) from 1/31/2024 in [facility] 2 with Alcides Helton, DO   DVT Score (IF A SCORE IS NOT CALCULATING, MUST SELECT A BMI TO COMPLETE) 8 filed at 06/02/2025 1054 10 filed at 01/30/2024 1325   Surgical Factors Major surgery planned, lasting 2-3 hours filed at 06/02/2025 1054 --   BMI (BMI MUST BE CHOSEN) 41-50 (Morbid obesity) filed at 06/02/2025 1055 31-40 (Obesity) filed at 01/30/2024 1325   RETIRED: Current Status -- Elective major lower extremity  arthroplasty filed at 01/30/2024 1325   RETIRED: Age -- 60-75 years filed at 01/30/2024 1325          Modified Frailty Index    No data to display       [id number] Stroke Risk Points  Current as of 21 minutes ago        N/A 0 to 9 Points:      Last Change: N/A          The [id number] risk score (Joana BURK, et al. 2009. © 2010 [employer]) quantifies the risk of stroke for a patient with atrial fibrillation. For patients without atrial fibrillation or under the age of 18 this score appears as N/A. Higher score values generally indicate higher risk of stroke.        This score is not applicable to this patient. Components are not calculated.          Revised Cardiac Risk Index      Flowsheet Row Pre-Admission Testing from 6/2/2025 in [facility]   High-Risk Surgery (Intraperitoneal, Intrathoracic,Suprainguinal vascular) 0 filed at 06/02/2025 1103   History of ischemic heart disease (History of MI, History of positive exercuse test, Current chest paint considered due to myocardial ischemia, Use of nitrate therapy, ECG with pathological Q Waves) 0 filed at 06/02/2025 1103   History of congestive heart failure (pulmonary edemia, bilateral rales or S3 gallop, Paroxysmal nocturnal dyspnea, CXR showing pulmonary vascular redistribution) 0 filed at 06/02/2025 1103   History of cerebrovascular disease (Prior TIA or stroke) 0 filed at 06/02/2025 1103   Pre-operative insulin treatment 0 filed at 06/02/2025 1103   Pre-operative creatinine>2 mg/dl 0 filed at 06/02/2025 1103   Revised Cardiac Risk Calculator 0 filed at 06/02/2025 1103          Apfel Simplified Score      Flowsheet Row Pre-Admission Testing from 6/2/2025 in [facility]   Smoking status 1 filed at 06/02/2025 1103   History of motion sickness or PONV  1 filed at 06/02/2025 1103   Use of postoperative opioids 0 filed at 06/02/2025 1103   Gender - Female 1=Yes filed at 06/02/2025 1103   Apfel Simplified Score  Calculator 3 filed at 06/02/2025 1103          Risk Analysis Index Results This Encounter    No data found in the last 10 encounters.       Stop Bang Score      Flowsheet Row Pre-Admission Testing from 6/2/2025 in [facility] Clinical Support from 5/21/2025 in [facility]   Do you snore loudly? 1 filed at 06/02/2025 1008 1 filed at 05/21/2025 1314   Do you often feel tired or fatigued after your sleep? 1 filed at 06/02/2025 1008 1 filed at 05/21/2025 1314   Has anyone ever observed you stop breathing in your sleep? 1 filed at 06/02/2025 1008 1 filed at 05/21/2025 1314   Do you have or are you being treated for high blood pressure? 0 filed at 06/02/2025 1008 0 filed at 05/21/2025 1314   Recent BMI (Calculated) 43.2 filed at 06/02/2025 1008 38.8 filed at 05/21/2025 1314   Is BMI greater than 35 kg/m2? 1=Yes filed at 06/02/2025 1008 1=Yes filed at 05/21/2025 1314   Age older than 50 years old? 1=Yes filed at 06/02/2025 1008 1=Yes filed at 05/21/2025 1314   Is your neck circumference greater than 17 inches (Male) or 16 inches (Female)? 1 filed at 06/02/2025 1008 --   Gender - Male 0=No filed at 06/02/2025 1008 0=No filed at 05/21/2025 1314   STOP-BANG Total Score 6 filed at 06/02/2025 1008 --          Prodigy: High Risk  Total Score: 8              Prodigy Age Score           ARISCAT Score for Postoperative Pulmonary Complications      Flowsheet Row Pre-Admission Testing from 6/2/2025 in [facility]   Age Calculated Score 3 filed at 06/02/2025 1103   Preoperative SpO2 0 filed at 06/02/2025 1103   Respiratory infection in the last month Either upper or lower (i.e., URI, bronchitis, pneumonia), with fever and antibiotic treatment 0 filed at 06/02/2025 1103   Preoperative anemia (Hgb less than 10 g/dl) 0 filed at 06/02/2025 1103   Surgical incision  0 filed at 06/02/2025 1103   Duration of surgery  16 filed at 06/02/2025 1103   Emergency Procedure  0 filed at 06/02/2025 1103    ARISCAT Total Score  19 filed at 06/02/2025 1103          [place] Perioperative Risk for Myocardial Infarction or Cardiac Arrest (ABBEY)      Flowsheet Row Pre-Admission Testing from 6/2/2025 in [facility]   Calculated Age Score 1.24 filed at 06/02/2025 1104   Functional Status  0 filed at 06/02/2025 1104   ASA Class  -1.92 filed at 06/02/2025 1104   Creatinine 0 filed at 06/02/2025 1104   Type of Procedure  0.80 filed at 06/02/2025 1104   ABBEY Total Score  -5.13 filed at 06/02/2025 1104   ABBEY % 0.59 filed at 06/02/2025 1104          Assessment and Plan   63 yo female presents to [facility] for risk assessment and preoperative optimization in advance of her shoulder procedure.    Today Her BP is elevated, she is afebrile, pulse ox 96% on room air at rest. I have advised her to home monitor and call PCP if remains elevated. Asked her to document her BP daily and bring on the day of surgery, also to call us if started on new medication    Neuro:  No diagnosis or significant findings on chart review or clinical presentation and evaluation.     HEENT/Airway:  No diagnosis or significant findings on chart review or clinical presentation and evaluation.     Cardiovascular:  Denies chest pain, shortness of breathe, dizziness, palpations, or lightheadedness    METS are  8.2, RCRI is 0 (3.9% risk for 30 day postoperative MACE)  ABBEY indicates a 0.59% risk of intraoperative or 30 day postoperative ABBEY  No additional preoperative testing is currently indicated.    EKG - 6/2/2025 NSR, minimal voltage criteria for LVH, may be a normal variant (R in aVL)    Pulmonary:  BONIAT she is not compliant with cpap, discussed importance of use and advised to bring on day of surgery    PRODIGY: Low risk for opioid induced respiratory depression  Discussed smoking cessation and deep breathing handout given    Renal:   No diagnosis or significant findings on chart review, or clinical presentation and evaluation.     Pt at Low risk  for perioperative BIPIN based on Dynamic Predictive Scoring Tool for Perioperative BIPIN  Lab Results   Component Value Date    GLUCOSE 152 (H) 02/01/2024    CALCIUM 8.7 02/01/2024     02/01/2024    K 4.3 02/01/2024    CO2 23 02/01/2024     02/01/2024    BUN 10 02/01/2024    CREATININE 0.48 (L) 02/01/2024       Endocrine:  No diagnosis or significant findings on chart review or clinical presentation and evaluation.     Lab Results   Component Value Date    HGBA1C 6.1 (H) 01/18/2024       Hematologic:  No diagnosis or significant findings on chart review or clinical presentation and evaluation.  Lab Results   Component Value Date    WBC 17.8 (H) 02/01/2024    HGB 13.2 02/01/2024    HCT 39.0 02/01/2024    MCV 86 02/01/2024     02/01/2024       CAPRINI SCORE 8  Pt supplied education/VTE handout    Gastrointestinal:   No diagnosis or significant findings on chart review or clinical presentation and evaluation.   EAT-10 score of 0 - self-perceived oropharyngeal dysphagia scale (0-40)      :  No diagnosis or significant findings on chart review or clinical presentation and evaluation.     Infectious disease:   No diagnosis or significant findings on chart review or clinical presentation and evaluation.     Musculoskeletal:   See HPI  Last dose of NSAIDS  7 days preop  Instructed if  no issues with your liver you may take Tylenol 2-500 mg tablets every 8 hrs around the clock for pain including morning of surgery with a sip of water    Preoperative risk assessment  ASA II  NSQIP - Predicted length of stay days -0-1  PAT Testing - bmp, cbc, A1c, EKG    Anesthesia:  She has delayed emergence    denies dental issues  Smoker-denies  Drugs-denies  ETOH-rare  denies personal/family issues with Anesthesia    Face to Face patient contact time 45 min    THIEN George 6/2/2025 11:12 AM               [1]   Past Medical History:  Diagnosis Date    Acute vaginitis 02/04/2015    Bacterial vaginosis    Anxiety      Arthritis     Candidiasis, unspecified 2015    Yeast infection    CPAP (continuous positive airway pressure) dependence     Delayed emergence from general anesthesia     Depression     Encounter for screening mammogram for malignant neoplasm of breast 01/15/2016    Visit for screening mammogram    Encounter for screening, unspecified 2015    Screening    Fractures     ankle    Joint pain     Obesity     Personal history of other diseases of the female genital tract     History of ovarian cyst    Personal history of other diseases of the female genital tract 2015    History of menorrhagia    Personal history of other endocrine, nutritional and metabolic disease     History of dehydration    Plantar fascial fibromatosis 2015    Plantar fasciitis    Sleep apnea     uses cpap    Unspecified benign mammary dysplasia of unspecified breast     Atypical lobular hyperplasia of breast    Unspecified ovarian cyst, left side 2015    Cyst of left ovary    Vertigo    [2]   Past Surgical History:  Procedure Laterality Date    BREAST BIOPSY      BREAST LUMPECTOMY       SECTION, CLASSIC  2015     Section x2    COLONOSCOPY      FOOT SURGERY Right 2015    Foot Repair    HYSTERECTOMY      with BSO    JOINT REPLACEMENT      KNEE ARTHROPLASTY      OOPHORECTOMY      OTHER SURGICAL HISTORY Bilateral 2015    Bx Breast Percutan Needle Core Use Imag Guide (Stereotactic)    OTHER SURGICAL HISTORY Left     triggere finger release    OTHER SURGICAL HISTORY Right     thumb tendon release    OTHER SURGICAL HISTORY Bilateral     breast mass removed    OTHER SURGICAL HISTORY Right     right ankle ORIF w/ HW    ROTATOR CUFF REPAIR Right     TONSILLECTOMY  2015    Tonsillectomy    TUBAL LIGATION  2015    Tubal Ligation    UMBILICAL HERNIA REPAIR  2015    Umbilical Hernia Repair    UPPER GASTROINTESTINAL ENDOSCOPY      VEIN SURGERY     [3]   Family History  Problem  Relation Name Age of Onset    COPD Mother      Sleep apnea Mother      Heart disease Mother          CABG 10/2024    Heart disease Father          MI and CABG    Sleep apnea Father      Other (vertigo) Father      Other (lobular hyperplagia) Sister     [4]   Allergies  Allergen Reactions    Adhesive Hives and Other     Skin tore     Skin tore    Acetaminophen GI Upset

## 2025-06-02 NOTE — PREPROCEDURE INSTRUCTIONS
Medication List            Accurate as of June 2, 2025 10:29 AM. Always use your most recent med list.                ibuprofen 200 mg tablet  Additional Medication Adjustments for Surgery: Take last dose 7 days before surgery     venlafaxine XR 75 mg 24 hr capsule  Commonly known as: Effexor-XR  Medication Adjustments for Surgery: Take/Use as prescribed          If no issues with your liver you may take Tylenol 2-500 mg tablets every 8 hrs around the clock for pain including morning of surgery with a sip of water    STOP VITAMINS, SUPPLEMENTS, HERBS, PROBIOTICS, AND FISH OIL, NO MOTRIN OR ADVIL OR ALEVE 7 DAYS BEFORE SURGERY    IF YOU HAVE A CPAP MACHINE BRING ON DAY OF SURGERY    Additional Instructions:  if not a joint replacement, body wash and mouth wash do not pertain to you     Seven/Six Days before Surgery:  Review your medication instructions, stop indicated medications  Five Days before Surgery:  Review your medication instructions, stop indicated medications  Three Days before Surgery:  Review your medication instructions, stop indicated medications  The Day before Surgery:  No smoking or alcohol use 24 hours before surgery  Review your medication instructions, stop indicated medications  You will be contacted regarding the time of your arrival to facility and surgery time  Do not eat any food after Midnight  Day of Surgery:  Review your medication instructions, take indicated medications  If you have diabetes, please check your fasting blood sugar upon awakening.  If fasting blood sugar is <80 mg/dl, drink 100 ml of apple juice, time limit of 2 hours before  You may have clear liquids until TWO hours before surgery/procedure.  This includes water, black tea/coffee, (no milk or cream) apple juice and electrolyte drinks (Gatorade)  You may chew gum up to TWO hours before your surgery/procedure  Wear  comfortable loose fitting clothing  Do not use moisturizers, creams, lotions or perfume  All jewelry and  valuables should be left at home     CONTACT SURGEON'S OFFICE IF YOU DEVELOP:  * Fever = 100.4 F   * New respiratory symptoms (e.g. cough, shortness of breath, respiratory distress, sore throat)  * Recent loss of taste or smell  *Flu like symptoms such as headache, fatigue or gastrointestinal symptoms  * You develop any open sores, shingles, burning or painful urination   AND/OR:  * You no longer wish to have the surgery.  * Any other personal circumstances change that may lead to the need to cancel or defer this surgery.  *You were admitted to any hospital within one week of your planned procedure.     SMOKING:  *Quitting smoking can make a huge difference to your health and recovery from surgery.    *If you need help with quitting, call 8-800QUIT-NOW.     Preoperative Fasting Guidelines     Why must I stop eating and drinking near surgery time?  With sedation, food or liquid in your stomach can enter your lungs causing serious complications  Increases nausea and vomiting     When do I need to stop eating and drinking before my surgery?  Do not eat any food after midnight the night before your surgery/procedure.  You may have up to TEN ounces of clear liquid until TWO hours before your instructed arrival time to the hospital.  This includes water, black tea/coffee, (no milk or cream) apple juice, and electrolyte drinks (Gatorade)  You may chew gum until TWO hours before your surgery/procedure     SURGICAL TIME:  *You will be contacted between 11 am and 3 pm  the business day before your surgery with your arrival time.  *If you haven't received a call by 3pm, call your surgeons office, do not rely on MyChart time as this is not confirmed until the day before surgery.  *Scheduled surgery times may change and you will be notified if this occurs-check your personal voicemail for any updates.     ON THE MORNING OF SURGERY:  *Wear comfortable, loose fitting clothing.   *Do not use moisturizers, creams, lotions or  perfume.  *All jewelry and valuables should be left at home.  *Prosthetic devices such as contact lenses, hearing aids, dentures, eyelash extensions, hairpins and body piercing must be removed before surgery.     BRING WITH YOU:  *Photo ID and insurance card  *Current list of medications and allergies  *Pacemaker/Defibrillator/Heart stent cards  *CPAP machine and mask  *Slings/splints/crutches  *Copy of your complete Advanced Directive/DHPOA-if applicable  *Neurostimulator implant remote     PARKING AND ARRIVAL:  *Check in at the Main Entrance desk and let them know you are here for surgery.     IF YOU ARE HAVING OUTPATIENT/SAME DAY SURGERY:  *A responsible adult MUST accompany you at the time of discharge and stay with you for 24 hours after your surgery.  *You may NOT drive yourself home after surgery.  *You may use a taxi or ride sharing service ([address], Uber) to return home ONLY if you are accompanied by a friend or family member.  *Instructions for resuming your medications will be provided by your surgeon.     Thank you for coming to Pre Admission testing.      If I have prescribe medication please don't forget to  at your pharmacy.      Any questions about today's visit call [phone] and leave a message in the general mailbox.     Patient instructed to ambulate as soon as possible postoperatively to decrease thromboembolic risk.     Jef Mathis, PAULINO-CNP     Thank you for visiting the [facility].  If you have any changes to your health condition, please call the surgeons office to alert them and give them details of your symptoms.     Additional Instructions:      The Day before Surgery:  -Review your medication instructions, stop indicated medications  -You will be contacted in the evening regarding the time of your arrival to facility and surgery time     Day of Surgery:  -Review your medication instructions, take indicated medications  -Wear comfortable loose fitting  clothing  -Do not use moisturizers, creams, lotions or perfume  -All jewelry and valuables should be left at home              Preoperative Brain Exercises     What are brain exercises?  A brain exercise is any activity that engages your thinking (cognitive) skills.     What types of activities are considered brain exercises?  Jigsaw puzzles, crossword puzzles, word jumble, memory games, word search, and many more.  Many can be found free online or on your phone via a mobile monique.     Why should I do brain exercises before my surgery?  More recent research has shown brain exercise before surgery can lower the risk of postoperative delirium (confusion) which can be especially important for older adults.  Patients who did brain exercises for 5 to 10 minutes a day the days before surgery, cut their risk of postoperative delirium in half up to 1 week after surgery.                 [facility]     Preoperative Deep Breathing Exercises     Why it is important to do deep breathing exercises before my surgery?  Deep breathing exercises strengthen your breathing muscles.  This helps you to recover after your surgery and decreases the chance of breathing complications.      How are the deep breathing exercises done?  Sit straight with your back supported.  Breathe in deeply and slowly through your nose. Your lower rib cage should expand and your abdomen may move forward.  Hold that breath for 3 to 5 seconds.  Breathe out through pursed lips, slowly and completely.  Rest and repeat 10 times every hour while awake.  Rest longer if you become dizzy or lightheaded.                      [facility]  Preoperative Deep Breathing Exercises     Why it is important to do deep breathing exercises before my surgery?  Deep breathing exercises strengthen your breathing muscles.  This helps you to recover after your surgery and decreases the chance of breathing complications.        How are  the deep breathing exercises done?  Sit straight with your back supported.  Breathe in deeply and slowly through your nose. Your lower rib cage should expand and your abdomen may move forward.  Hold that breath for 3 to 5 seconds.  Breathe out through pursed lips, slowly and completely.  Rest and repeat 10 times every hour while awake.  Rest longer if you become dizzy or lightheaded.        Patient Information: Incentive Spirometer  What is an incentive spirometer?  An incentive spirometer is a device used before and after surgery to “exercise” your lungs.  It helps you to take deeper breaths to expand your lungs.  Below is an example of a basic incentive spirometer.  The device you receive may differ slightly but they all function the same.    Why do I need to use an incentive spirometer?  Using your incentive spirometer prepares your lungs for surgery and helps prevent lung problems after surgery.  How do I use my incentive spirometer?  When you're using your incentive spirometer, make sure to breathe through your mouth. If you breathe through your nose, the incentive spirometer won't work properly. You can hold your nose if you have trouble.  If you feel dizzy at any time, stop and rest. Try again at a later time.  Follow the steps below:  Set up your incentive spirometer, expand the flexible tubing and connect to the outlet.  Sit upright in a chair or bed. Hold the incentive spirometer at eye level.   Put the mouthpiece in your mouth and close your lips tightly around it. Slowly breathe out (exhale) completely.  Breathe in (inhale) slowly through your mouth as deeply as you can. As you take a breath, you will see the piston rise inside the large column. While the piston rises, the indicator should move upwards. It should stay in between the 2 arrows (see Figure).  Try to get the piston as high as you can, while keeping the indicator between the arrows.   If the indicator doesn't stay between the arrows, you're  breathing either too fast or too slow.  When you get it as high as you can, hold your breath for 10 seconds, or as long as possible. While you're holding your breath, the piston will slowly fall to the base of the spirometer.  Once the piston reaches the bottom of the spirometer, breathe out slowly through your mouth. Rest for a few seconds.  Repeat 10 times. Try to get the piston to the same level with each breath.  Repeat every hour while awake  You can carefully clean the outside of the mouthpiece with an alcohol wipe or soap and water.       Patient and Family Education        Ways You Can Help Prevent Blood Clots               This handout explains some simple things you can do to help prevent blood clots.      Blood clots are blockages that can form in the body's veins. When a blood clot forms in your deep veins, it may be called a deep vein thrombosis, or DVT for short. Blood clots can happen in any part of the body where blood flows, but they are most common in the arms and legs. If a piece of a blood clot breaks free and travels to the lungs, it is called a pulmonary embolus (PE). A PE can be a very serious problem.         Being in the hospital or having surgery can raise your chances of getting a blood clot because you may not be well enough to move around as much as you normally do.         Ways you can help prevent blood clots in the hospital           Wearing SCDs. SCDs stands for Sequential Compression Devices.   SCDs are special sleeves that wrap around your legs  They attach to a pump that fills them with air to gently squeeze your legs every few minutes.   This helps return the blood in your legs to your heart.   SCDs should only be taken off when walking or bathing.   SCDs may not be comfortable, but they can help save your life.                                            Wearing compression stockings - if your doctor orders them. These special snug fitting stockings gently squeeze your legs to  help blood flow.       Walking. Walking helps move the blood in your legs.   If your doctor says it is ok, try walking the halls at least   5 times a day. Ask us to help you get up, so you don't fall.      Taking any blood thinning medicines your doctor orders.        Page 1 of 2            [facility]; 3/23   Ways you can help prevent blood clots at home         Wearing compression stockings - if your doctor orders them. ? Walking - to help move the blood in your legs.       Taking any blood thinning medicines your doctor orders.      Signs of a blood clot or PE        Tell your doctor or nurse know right away if you have of the problems listed below.    If you are at home, seek medical care right away. Call 911 for chest pain or problems breathing.                Signs of a blood clot (DVT) - such as pain,  swelling, redness or warmth in your arm or leg      Signs of a pulmonary embolism (PE) - such as chest pain or feeling short of breath    Preoperative Clearances  -If you are informed by the preadmission testing team that you need clearance for your surgery, please reach out to the provider in question to assisting accommodating obtaining your clearance.   -Please have you provider fax your clearance letter to [phone] if needed.   -A blank clearance letter will be provided to you if indicated.     Anticoagulation  -If you are on oral anticoagulation such as Coumadin, Eliquis, Xarelto, Plavix, Brilinta, Pradaxa; we will need to obtain preoperative anticoagulation instructions from the prescribing provider.   -We will reach out to the prescriber but do encourage you to call their office as well as it will increase the chances of getting the necessary information.   -We will contact you with the instruction once we obtain them.       PAULINO George-CNP    Thank you for visiting the [facility].  If you have any changes to your health condition, please call the surgeons  office to alert them and give them details of your symptoms.

## 2025-06-03 LAB
ATRIAL RATE: 84 BPM
P AXIS: 8 DEGREES
P OFFSET: 211 MS
P ONSET: 154 MS
PR INTERVAL: 140 MS
Q ONSET: 224 MS
QRS COUNT: 14 BEATS
QRS DURATION: 84 MS
QT INTERVAL: 356 MS
QTC CALCULATION(BAZETT): 420 MS
QTC FREDERICIA: 398 MS
R AXIS: -14 DEGREES
T AXIS: 26 DEGREES
T OFFSET: 402 MS
VENTRICULAR RATE: 84 BPM

## 2025-06-04 ENCOUNTER — ANESTHESIA EVENT (OUTPATIENT)
Dept: OPERATING ROOM | Facility: HOSPITAL | Age: 65
End: 2025-06-04
Payer: COMMERCIAL

## 2025-06-05 ENCOUNTER — ANESTHESIA (OUTPATIENT)
Dept: OPERATING ROOM | Facility: HOSPITAL | Age: 65
End: 2025-06-05
Payer: COMMERCIAL

## 2025-06-05 ENCOUNTER — HOSPITAL ENCOUNTER (OUTPATIENT)
Facility: HOSPITAL | Age: 65
Setting detail: OUTPATIENT SURGERY
Discharge: HOME | End: 2025-06-05
Attending: ORTHOPAEDIC SURGERY | Admitting: ORTHOPAEDIC SURGERY
Payer: COMMERCIAL

## 2025-06-05 VITALS
HEART RATE: 89 BPM | OXYGEN SATURATION: 92 % | HEIGHT: 69 IN | WEIGHT: 293 LBS | SYSTOLIC BLOOD PRESSURE: 143 MMHG | TEMPERATURE: 97.9 F | DIASTOLIC BLOOD PRESSURE: 79 MMHG | RESPIRATION RATE: 13 BRPM | BODY MASS INDEX: 43.4 KG/M2

## 2025-06-05 DIAGNOSIS — M75.112 NONTRAUMATIC INCOMPLETE TEAR OF LEFT ROTATOR CUFF: ICD-10-CM

## 2025-06-05 PROCEDURE — A4649 SURGICAL SUPPLIES: HCPCS | Performed by: ORTHOPAEDIC SURGERY

## 2025-06-05 PROCEDURE — 3700000002 HC GENERAL ANESTHESIA TIME - EACH INCREMENTAL 1 MINUTE: Performed by: ORTHOPAEDIC SURGERY

## 2025-06-05 PROCEDURE — A29827 PR SHLDR ARTHROSCOP,SURG,W/ROTAT CUFF REPR: Performed by: ANESTHESIOLOGY

## 2025-06-05 PROCEDURE — 7100000001 HC RECOVERY ROOM TIME - INITIAL BASE CHARGE: Performed by: ORTHOPAEDIC SURGERY

## 2025-06-05 PROCEDURE — 2500000005 HC RX 250 GENERAL PHARMACY W/O HCPCS: Performed by: ORTHOPAEDIC SURGERY

## 2025-06-05 PROCEDURE — 87102 FUNGUS ISOLATION CULTURE: CPT | Performed by: ORTHOPAEDIC SURGERY

## 2025-06-05 PROCEDURE — 2500000004 HC RX 250 GENERAL PHARMACY W/ HCPCS (ALT 636 FOR OP/ED): Performed by: STUDENT IN AN ORGANIZED HEALTH CARE EDUCATION/TRAINING PROGRAM

## 2025-06-05 PROCEDURE — 3600000009 HC OR TIME - EACH INCREMENTAL 1 MINUTE - PROCEDURE LEVEL FOUR: Performed by: ORTHOPAEDIC SURGERY

## 2025-06-05 PROCEDURE — 3700000001 HC GENERAL ANESTHESIA TIME - INITIAL BASE CHARGE: Performed by: ORTHOPAEDIC SURGERY

## 2025-06-05 PROCEDURE — 2780000003 HC OR 278 NO HCPCS: Performed by: ORTHOPAEDIC SURGERY

## 2025-06-05 PROCEDURE — 64415 NJX AA&/STRD BRCH PLXS IMG: CPT | Performed by: STUDENT IN AN ORGANIZED HEALTH CARE EDUCATION/TRAINING PROGRAM

## 2025-06-05 PROCEDURE — 7100000009 HC PHASE TWO TIME - INITIAL BASE CHARGE: Performed by: ORTHOPAEDIC SURGERY

## 2025-06-05 PROCEDURE — 2500000004 HC RX 250 GENERAL PHARMACY W/ HCPCS (ALT 636 FOR OP/ED): Performed by: ORTHOPAEDIC SURGERY

## 2025-06-05 PROCEDURE — 7100000002 HC RECOVERY ROOM TIME - EACH INCREMENTAL 1 MINUTE: Performed by: ORTHOPAEDIC SURGERY

## 2025-06-05 PROCEDURE — 2500000004 HC RX 250 GENERAL PHARMACY W/ HCPCS (ALT 636 FOR OP/ED): Performed by: NURSE ANESTHETIST, CERTIFIED REGISTERED

## 2025-06-05 PROCEDURE — 2720000007 HC OR 272 NO HCPCS: Performed by: ORTHOPAEDIC SURGERY

## 2025-06-05 PROCEDURE — A29827 PR SHLDR ARTHROSCOP,SURG,W/ROTAT CUFF REPR: Performed by: NURSE ANESTHETIST, CERTIFIED REGISTERED

## 2025-06-05 PROCEDURE — C1713 ANCHOR/SCREW BN/BN,TIS/BN: HCPCS | Performed by: ORTHOPAEDIC SURGERY

## 2025-06-05 PROCEDURE — 7100000010 HC PHASE TWO TIME - EACH INCREMENTAL 1 MINUTE: Performed by: ORTHOPAEDIC SURGERY

## 2025-06-05 PROCEDURE — 3600000004 HC OR TIME - INITIAL BASE CHARGE - PROCEDURE LEVEL FOUR: Performed by: ORTHOPAEDIC SURGERY

## 2025-06-05 PROCEDURE — 2500000004 HC RX 250 GENERAL PHARMACY W/ HCPCS (ALT 636 FOR OP/ED)

## 2025-06-05 PROCEDURE — 87070 CULTURE OTHR SPECIMN AEROBIC: CPT | Mod: BEALAB | Performed by: ORTHOPAEDIC SURGERY

## 2025-06-05 RX ORDER — FENTANYL CITRATE 50 UG/ML
50 INJECTION, SOLUTION INTRAMUSCULAR; INTRAVENOUS EVERY 5 MIN PRN
Status: DISCONTINUED | OUTPATIENT
Start: 2025-06-05 | End: 2025-06-05 | Stop reason: HOSPADM

## 2025-06-05 RX ORDER — ONDANSETRON HYDROCHLORIDE 2 MG/ML
4 INJECTION, SOLUTION INTRAVENOUS ONCE AS NEEDED
Status: DISCONTINUED | OUTPATIENT
Start: 2025-06-05 | End: 2025-06-05 | Stop reason: HOSPADM

## 2025-06-05 RX ORDER — SODIUM CHLORIDE, SODIUM LACTATE, POTASSIUM CHLORIDE, CALCIUM CHLORIDE 600; 310; 30; 20 MG/100ML; MG/100ML; MG/100ML; MG/100ML
100 INJECTION, SOLUTION INTRAVENOUS CONTINUOUS
Status: DISCONTINUED | OUTPATIENT
Start: 2025-06-05 | End: 2025-06-05 | Stop reason: HOSPADM

## 2025-06-05 RX ORDER — MEPERIDINE HYDROCHLORIDE 25 MG/ML
12.5 INJECTION INTRAMUSCULAR; INTRAVENOUS; SUBCUTANEOUS EVERY 10 MIN PRN
Status: DISCONTINUED | OUTPATIENT
Start: 2025-06-05 | End: 2025-06-05 | Stop reason: HOSPADM

## 2025-06-05 RX ORDER — MIDAZOLAM HYDROCHLORIDE 1 MG/ML
2 INJECTION, SOLUTION INTRAMUSCULAR; INTRAVENOUS ONCE
Status: COMPLETED | OUTPATIENT
Start: 2025-06-05 | End: 2025-06-05

## 2025-06-05 RX ORDER — PROPOFOL 10 MG/ML
INJECTION, EMULSION INTRAVENOUS AS NEEDED
Status: DISCONTINUED | OUTPATIENT
Start: 2025-06-05 | End: 2025-06-05

## 2025-06-05 RX ORDER — ONDANSETRON HYDROCHLORIDE 2 MG/ML
INJECTION, SOLUTION INTRAVENOUS AS NEEDED
Status: DISCONTINUED | OUTPATIENT
Start: 2025-06-05 | End: 2025-06-05

## 2025-06-05 RX ORDER — ALBUTEROL SULFATE 0.83 MG/ML
2.5 SOLUTION RESPIRATORY (INHALATION) ONCE AS NEEDED
Status: DISCONTINUED | OUTPATIENT
Start: 2025-06-05 | End: 2025-06-05 | Stop reason: HOSPADM

## 2025-06-05 RX ORDER — CEFAZOLIN SODIUM IN 0.9 % NACL 3 G/100 ML
3 INTRAVENOUS SOLUTION, PIGGYBACK (ML) INTRAVENOUS ONCE
Status: COMPLETED | OUTPATIENT
Start: 2025-06-05 | End: 2025-06-05

## 2025-06-05 RX ORDER — HYDRALAZINE HYDROCHLORIDE 20 MG/ML
5 INJECTION INTRAMUSCULAR; INTRAVENOUS EVERY 30 MIN PRN
Status: DISCONTINUED | OUTPATIENT
Start: 2025-06-05 | End: 2025-06-05 | Stop reason: HOSPADM

## 2025-06-05 RX ORDER — FENTANYL CITRATE 50 UG/ML
50 INJECTION, SOLUTION INTRAMUSCULAR; INTRAVENOUS ONCE
Status: COMPLETED | OUTPATIENT
Start: 2025-06-05 | End: 2025-06-05

## 2025-06-05 RX ORDER — MIDAZOLAM HYDROCHLORIDE 1 MG/ML
1 INJECTION, SOLUTION INTRAMUSCULAR; INTRAVENOUS ONCE AS NEEDED
Status: DISCONTINUED | OUTPATIENT
Start: 2025-06-05 | End: 2025-06-05 | Stop reason: HOSPADM

## 2025-06-05 RX ORDER — LIDOCAINE HYDROCHLORIDE 10 MG/ML
0.1 INJECTION, SOLUTION EPIDURAL; INFILTRATION; INTRACAUDAL; PERINEURAL ONCE
Status: DISCONTINUED | OUTPATIENT
Start: 2025-06-05 | End: 2025-06-05 | Stop reason: HOSPADM

## 2025-06-05 RX ORDER — OXYCODONE HYDROCHLORIDE 5 MG/1
5 TABLET ORAL EVERY 6 HOURS PRN
Qty: 28 TABLET | Refills: 0 | Status: SHIPPED | OUTPATIENT
Start: 2025-06-05 | End: 2025-06-12

## 2025-06-05 RX ADMIN — ONDANSETRON 4 MG: 2 INJECTION, SOLUTION INTRAMUSCULAR; INTRAVENOUS at 08:45

## 2025-06-05 RX ADMIN — PROPOFOL 200 MG: 10 INJECTION, EMULSION INTRAVENOUS at 08:41

## 2025-06-05 RX ADMIN — Medication 3 G: at 08:42

## 2025-06-05 RX ADMIN — DEXAMETHASONE SODIUM PHOSPHATE 4 MG: 4 INJECTION, SOLUTION INTRAMUSCULAR; INTRAVENOUS at 08:45

## 2025-06-05 RX ADMIN — FENTANYL CITRATE 50 MCG: 50 INJECTION INTRAMUSCULAR; INTRAVENOUS at 08:20

## 2025-06-05 RX ADMIN — MIDAZOLAM 2 MG: 1 INJECTION INTRAMUSCULAR; INTRAVENOUS at 08:20

## 2025-06-05 SDOH — HEALTH STABILITY: MENTAL HEALTH: CURRENT SMOKER: 0

## 2025-06-05 ASSESSMENT — PAIN DESCRIPTION - DESCRIPTORS
DESCRIPTORS: NUMBNESS
DESCRIPTORS: SORE
DESCRIPTORS: ACHING
DESCRIPTORS: NUMBNESS

## 2025-06-05 ASSESSMENT — PAIN - FUNCTIONAL ASSESSMENT
PAIN_FUNCTIONAL_ASSESSMENT: 0-10

## 2025-06-05 ASSESSMENT — PAIN SCALES - GENERAL
PAINLEVEL_OUTOF10: 8
PAINLEVEL_OUTOF10: 0 - NO PAIN
PAINLEVEL_OUTOF10: 3
PAIN_LEVEL: 0
PAINLEVEL_OUTOF10: 3

## 2025-06-05 ASSESSMENT — COLUMBIA-SUICIDE SEVERITY RATING SCALE - C-SSRS
6. HAVE YOU EVER DONE ANYTHING, STARTED TO DO ANYTHING, OR PREPARED TO DO ANYTHING TO END YOUR LIFE?: NO
1. IN THE PAST MONTH, HAVE YOU WISHED YOU WERE DEAD OR WISHED YOU COULD GO TO SLEEP AND NOT WAKE UP?: NO
2. HAVE YOU ACTUALLY HAD ANY THOUGHTS OF KILLING YOURSELF?: NO

## 2025-06-05 NOTE — ANESTHESIA POSTPROCEDURE EVALUATION
Patient: Phyllis Castro    Procedure Summary       Date: 06/05/25 Room / Location: [facility] / [facility]    Anesthesia Start: 0838 Anesthesia Stop: 0956    Procedures:       DEBRIDEMENT, SHOULDER (Left: Shoulder)      ARTHROSCOPY, SHOULDER, WITH ROTATOR CUFF REPAIR ( ARTHREX ) (Left: Shoulder) Diagnosis:       Nontraumatic incomplete tear of left rotator cuff      (M75.112)    Surgeons: Alfred Marcum DO Responsible Provider: Arpit Persaud MD    Anesthesia Type: general ASA Status: 2            Anesthesia Type: general    Vitals Value Taken Time   /74 06/05/25 09:57   Temp 97 06/05/25 09:57   Pulse 90 06/05/25 09:57   Resp 14 06/05/25 09:57   SpO2 96 06/05/25 09:57       Anesthesia Post Evaluation    Patient location during evaluation: PACU  Patient participation: complete - patient participated  Level of consciousness: awake  Pain score: 0  Pain management: adequate  Airway patency: patent  Cardiovascular status: acceptable  Respiratory status: acceptable  Hydration status: acceptable  Postoperative Nausea and Vomiting: none        There were no known notable events for this encounter.

## 2025-06-05 NOTE — ANESTHESIA PREPROCEDURE EVALUATION
Patient: Phyllis Castro    Procedure Information       Date/Time: 06/05/25 0840    Procedures:       DEBRIDEMENT, SHOULDER (Left: Shoulder)      ARTHROSCOPY, SHOULDER, WITH ROTATOR CUFF REPAIR ( ARTHREX ) (Left: Shoulder)    Location: [facility] / [facility]    Surgeons: Alfred Marcum, DO            Relevant Problems   Anesthesia   (+) Delayed emergence from anesthesia      Musculoskeletal   (+) Primary osteoarthritis of left knee       Clinical information reviewed:   Tobacco  Allergies  Meds   Med Hx  Surg Hx  OB Status  Fam Hx  Soc   Hx        NPO Detail:  NPO/Void Status  Date of Last Liquid: 06/04/25  Time of Last Liquid: 0055  Date of Last Solid: 06/04/25  Time of Last Solid: 2355  Time of Last Void: 0738         Physical Exam    Airway  Mallampati: II  TM distance: >3 FB  Neck ROM: full  Mouth opening: 3 or more finger widths     Cardiovascular - normal exam   Dental - normal exam     Pulmonary - normal exam   Abdominal - normal exam           Anesthesia Plan    History of general anesthesia?: yes  History of complications of general anesthesia?: no    ASA 2     general     The patient is not a current smoker.  Patient was not previously instructed to abstain from smoking on day of procedure.  Patient did not smoke on day of procedure.  Education provided regarding risk of obstructive sleep apnea.  intravenous induction   Postoperative pain plan includes opioids.  Trial extubation is planned.  Anesthetic plan and risks discussed with patient.  Use of blood products discussed with patient who consented to blood products.    Plan discussed with CAA, attending and CRNA.

## 2025-06-05 NOTE — ANESTHESIA PROCEDURE NOTES
Airway  Date/Time: 6/5/2025 8:41 AM  Reason: elective      Staffing  Performed: CRNA   Authorized by: Arpit Persaud MD    Performed by: PAULINO Cameron-MISA  Patient location during procedure: OR    Patient Condition  Indications for airway management: anesthesia  Patient position: sniffing  Sedation level: deep     Final Airway Details   Preoxygenated: yes  Final airway type: supraglottic airway  Successful airway: classic  Size: 4  Number of attempts at approach: 1

## 2025-06-05 NOTE — OP NOTE
DEBRIDEMENT, SHOULDER (L), ARTHROSCOPY, SHOULDER, WITH ROTATOR CUFF REPAIR ( ARTHREX ) (L) Operative Note     Date: 2025  OR Location: [facility]    Name: Phyllis Castro YOB: 1960, Age: 64 y.o., MRN: [id number], Sex: female    Diagnosis  Pre-op Diagnosis      * Nontraumatic incomplete tear of left rotator cuff [M75.112] Complete full-thickness rotator cuff tear left shoulder, osteoarthritis glenohumeral joint     Procedures  DEBRIDEMENT, SHOULDER  89483 - [place] [facility] ARTHROSCOPY SHOULDER LMTD DBRDMT 1/2    ARTHROSCOPY, SHOULDER, WITH ROTATOR CUFF REPAIR ( ARTHREX )  72644 - [place] [facility] ARTHROSCOPY SHOULDER W/ROTATOR CUFF RPR      Surgeons      * Alfred Marcum - Primary    Resident/Fellow/Other Assistant:  Surgeons and Role:  * No surgeons found with a matching role *    Staff:   Lissetteulator: Maira  Scrub Person: Michelle  Scrub Person: Priscilla  Scrub Person: Laura    Anesthesia Staff: Anesthesiologist: Arpit Persaud MD  CRNA: PAULINO Cameron-MISA    Procedure Summary  Anesthesia: General  ASA: II  Estimated Blood Loss: 5 mL  Intra-op Medications:   Administrations occurring from 0840 to 1050 on 25:   Medication Name Total Dose   EPINEPHrine (Adrenalin) 2 mg in sodium chloride 3,000 mL irrigation 6,000 mL   dexAMETHasone (Decadron) 4 mg/mL IV Syringe 2 mL 4 mg   ondansetron 2 mg/mL 4 mg   propofol (Diprivan) injection 10 mg/mL 200 mg   ceFAZolin (Ancef) 3 g in sodium chloride 0.9%  mL 3 g              Anesthesia Record               Intraprocedure I/O Totals       None           Specimen:   ID Type Source Tests Collected by Time   A : SUBACROMIAL FLUID- LEFT SHOULDER Fluid SHOULDER FINE NEEDLE ASPIRATION LEFT FUNGAL CULTURE/SMEAR, STERILE FLUID CULTURE/SMEAR Alfred Marcum DO 2025 0903                 Drains and/or Catheters: * None in log *    Tourniquet Times:         Implants:     Findings: Preincisional evaluation revealing no evidence of instability or adhesions.  Subacromial  aspiration prior to incision with roughly 8 cc of serous fluid obtained.  This was sent for culture analysis.  Video arthroscopy revealing evidence of an anterior superior full-thickness articular cartilage lesion of the humeral head.  Degenerative fraying of the glenoid labrum.  Centralized tear at the junction of the supraspinatus and infraspinatus roughly at the musculotendinous junction.  Partial-thickness tearing of the insertion at the footprint but this involving less than 20% of the thickness of the tendon.  Axillary pouch without signs of loose bodies.  Subscapularis intact.  Subacromial space with bursal changes.  Fraying of the bursal surface of the rotator cuff and confirmation of the centralized tear at the supraspinatus infraspinatus junction.  This at least a centimeter and a half proximal to the insertion.    Indications: Phyllis Castro is an 64 y.o. female who is having surgery for M75.112.     The patient was seen in the preoperative area. The risks, benefits, complications, treatment options, non-operative alternatives, expected recovery and outcomes were discussed with the patient. The possibilities of reaction to medication, pulmonary aspiration, injury to surrounding structures, bleeding, recurrent infection, the need for additional procedures, failure to diagnose a condition, and creating a complication requiring transfusion or operation were discussed with the patient. The patient concurred with the proposed plan, giving informed consent.  The site of surgery was properly noted/marked if necessary per policy. The patient has been actively warmed in preoperative area. Preoperative antibiotics have been ordered and given within 1 hours of incision. Venous thrombosis prophylaxis have been ordered including bilateral sequential compression devices    Procedure Details: After obtaining informed surgical consent and the administration of prophylactic antibiotics the patient underwent successful  regional block in the preoperative holding area.  She was then brought to the operating room and placed supine on the operative table where successful general anesthetic was administered.  Evaluation of the shoulder revealing no evidence of instability or adhesions.  Patient then placed into a well-padded right lateral decubitus position with the left upper extremity placed into longitudinal traction of 15 pounds in a slightly flexed and abducted position.  Sterile prep and drape of the left shoulder and upper extremity completed utilizing standard orthopedic protocol.  From preoperative MRI evaluation aspiration of the subacromial space was undertaken.  Roughly 8 cc of yellow clear serous fluid was encountered.  This was sent for analysis.  Posterior glenohumeral thoracically portal was then established followed by an anterior portal utilizing an outside in technique.  Findings as noted above.  From the anterior portal arthroscopic shaver electrocautery performed debridement of the degenerative labral tear in addition to performing debridement of the articular tearing of the rotator cuff.  Instruments were then removed from the glenohumeral joint and the scope was placed in the subacromial space.  Lateral portal was established from the lateral portal arthroscopic shaver electrocautery performed bursectomy in addition to subperiosteal dissection of the acromion.  Debridement of the bursa did reveal evidence of this full-thickness centralized tear.  The edges were debrided.  Margin convergence sutures were used to close the hole.  Stability confirmed to this upon completion.  The instruments were then withdrawn.  Portal sites were closed with 3-0 Prolene.  Xeroform gauze and a soft sterile dressing was applied.  Abduction sling also applied.  Patient was then awakened, extubated, transferred to hospital bed and taken to the postanesthesia care unit in stable condition.  Evidence of Infection: No   Complications:   None; patient tolerated the procedure well.    Disposition: PACU - hemodynamically stable.  Condition: stable                 Additional Details: Patient will follow-up in the [facility] office on 6/16/2025 at 9:45 AM.  Discharge instructions were provided.    Attending Attestation: I performed the procedure.    Alfred Marcum  Phone Number: [phone]

## 2025-06-05 NOTE — ANESTHESIA PROCEDURE NOTES
Peripheral Block    Patient location during procedure: pre-op  Medication administered at: 6/5/2025 8:27 AM  End time: 6/5/2025 8:32 AM  Reason for block: at surgeon's request and post-op pain management  Staffing  Performed: attending   Authorized by: Dae Bolton DO    Performed by: Dae Bolton DO  Preanesthetic Checklist  Completed: patient identified, IV checked, site marked, risks and benefits discussed, surgical consent, monitors and equipment checked, pre-op evaluation and timeout performed   Timeout performed at: 6/5/2025 8:20 AM  Peripheral Block  Patient position: sitting  Prep: ChloraPrep  Patient monitoring: heart rate, cardiac monitor and continuous pulse ox  Block type: interscalene  Laterality: left  Injection technique: single-shot  Guidance: ultrasound guided  Local infiltration: ropivacaine  Infiltration strength: 0.5 %  Dose: 20 mL  Needle  Needle gauge: 22 G  Needle length: 5 cm  Needle localization: ultrasound guidance  Assessment  Injection assessment: negative aspiration for heme, no paresthesia on injection, incremental injection and local visualized surrounding nerve on ultrasound  Paresthesia pain: none  Heart rate change: no  Slow fractionated injection: yes  Additional Notes  With 4mg Decadron

## 2025-06-08 LAB
BACTERIA FLD CULT: NORMAL
FUNGUS SPEC CULT: NORMAL
FUNGUS SPEC FUNGUS STN: NORMAL
GRAM STN SPEC: NORMAL
GRAM STN SPEC: NORMAL

## 2025-06-10 LAB
FUNGUS SPEC CULT: NORMAL
FUNGUS SPEC FUNGUS STN: NORMAL

## 2025-06-13 LAB
BACTERIA FLD CULT: NORMAL
GRAM STN SPEC: NORMAL
GRAM STN SPEC: NORMAL

## 2025-06-16 LAB
FUNGUS SPEC CULT: NORMAL
FUNGUS SPEC FUNGUS STN: NORMAL

## 2025-06-23 LAB
FUNGUS SPEC CULT: NORMAL
FUNGUS SPEC FUNGUS STN: NORMAL

## (undated) DEVICE — WOUND SYSTEM, DEBRIDEMENT & CLEANING, O.R DUOPAK

## (undated) DEVICE — STRYKER RIO DRAPE KIT (FORMERLY MFR'D BY MAKO)

## (undated) DEVICE — SUTURE, VICRYL, 1, 36 IN, CT-1, UNDYED

## (undated) DEVICE — Device

## (undated) DEVICE — GLOVE, SURGICAL, PROTEXIS PI ORTHO, 8.0, PF, LF

## (undated) DEVICE — CHECKPOINT KIT, FEMORAL/ TIBIAL

## (undated) DEVICE — GLOVE, SURGICAL, BIOGEL, OPTIFIT, SZ 8.0

## (undated) DEVICE — TUBING, IRRIGATION, HIGH FLOW, HAND PIECE SET

## (undated) DEVICE — TUBING, SUCTION, 6MM X 10, CLEAN N-COND

## (undated) DEVICE — HOOD, SURGICAL, FLYTE HYBRID

## (undated) DEVICE — NEEDLE, SPINAL, S/SU, 18GA 3IN, QUINCKE, STERILE

## (undated) DEVICE — TUBING, PUMP REDEUCE 8FT STERILE

## (undated) DEVICE — SUTURELASSO, 25D TIGHT CRV LEFT

## (undated) DEVICE — SUTURELASSO, 90D STRAIGHT

## (undated) DEVICE — SYRINGE, 50 CC, LUER LOCK

## (undated) DEVICE — CATHETER TRAY, SURESTEP, 14FR, PRECONNECTED DRAIN BAG, PVC

## (undated) DEVICE — STRIP, SKIN CLOSURE, STERI STRIP, REINFORCED, 0.5 X 4 IN

## (undated) DEVICE — TRAY, DRY PREP, PREMIUM

## (undated) DEVICE — TUBING, PATIENT 8FT STERILE

## (undated) DEVICE — BLANKET, LOWER BODY, VHA PLUS, ADULT

## (undated) DEVICE — TRACKING KIT, TM KNEE PROCEDURES, VIZADISC

## (undated) DEVICE — CONTAINER STERILE SPECIMEN 90ML, STERILE

## (undated) DEVICE — SOLUTION, INJECTION, USP, LACTATED RINGERS, LIFECARE, 1000ML

## (undated) DEVICE — PROBE, APOLLO RF, 90 DEG, EXTRA LARTGE

## (undated) DEVICE — CANNULA, BUTTON, PASSPORT, 8MM X 4CM

## (undated) DEVICE — SUTURE, VICRYL, 2-0, 27 IN, SH, UNDYED

## (undated) DEVICE — EXCAL 4MM X 13CM SINGLE

## (undated) DEVICE — SUTURE, PROLENE, 3-0, 18 IN, PS2, BLUE

## (undated) DEVICE — CUFF, TOURNIQUET, 30 X 4, DUAL PORT/SNGL BLADDER, DISP, LF

## (undated) DEVICE — SOLUTION, CHLORHEXIDINE, 4%, 4OZ

## (undated) DEVICE — GLOVE, SURGICAL, PROTEXIS PI , 8.0, PF, LF

## (undated) DEVICE — SUTURE, CTD, VICRYL, 2-0, UND, BR, CT-2

## (undated) DEVICE — HD SCORPION NEEDLE

## (undated) DEVICE — ADHESIVE, SKIN, MASTISOL, 2/3 CC VIAL

## (undated) DEVICE — BLADE, MAKO, SAGITTAL, NARROW

## (undated) DEVICE — BANDAGE, COFLEX, 6 X 5 YDS, TAN, STERILE, LF

## (undated) DEVICE — SUTURE, FIBERWIRE 2, T-5 TAPER NEEDLE, 38"

## (undated) DEVICE — BLADE, SAW, SAGITTAL, 18.0 X 1.27 X 90MM

## (undated) DEVICE — TIP, SUCTION, YANKAUER, FLEXIBLE